# Patient Record
Sex: FEMALE | Race: WHITE | NOT HISPANIC OR LATINO | Employment: STUDENT | URBAN - METROPOLITAN AREA
[De-identification: names, ages, dates, MRNs, and addresses within clinical notes are randomized per-mention and may not be internally consistent; named-entity substitution may affect disease eponyms.]

---

## 2017-01-03 ENCOUNTER — OFFICE VISIT (OUTPATIENT)
Dept: DERMATOLOGY | Facility: CLINIC | Age: 22
End: 2017-01-03
Attending: DERMATOLOGY
Payer: COMMERCIAL

## 2017-01-03 ENCOUNTER — THERAPY VISIT (OUTPATIENT)
Dept: PHYSICAL THERAPY | Facility: CLINIC | Age: 22
End: 2017-01-03
Payer: COMMERCIAL

## 2017-01-03 DIAGNOSIS — M25.572 CHRONIC PAIN OF BOTH ANKLES: ICD-10-CM

## 2017-01-03 DIAGNOSIS — L70.0 ACNE VULGARIS: ICD-10-CM

## 2017-01-03 DIAGNOSIS — M25.562 ACUTE PAIN OF LEFT KNEE: Primary | ICD-10-CM

## 2017-01-03 DIAGNOSIS — G89.29 CHRONIC PAIN OF BOTH ANKLES: ICD-10-CM

## 2017-01-03 DIAGNOSIS — M25.571 CHRONIC PAIN OF BOTH ANKLES: ICD-10-CM

## 2017-01-03 DIAGNOSIS — L21.9 DERMATITIS, SEBORRHEIC: Primary | ICD-10-CM

## 2017-01-03 PROCEDURE — 99211 OFF/OP EST MAY X REQ PHY/QHP: CPT | Mod: ZF

## 2017-01-03 PROCEDURE — 97110 THERAPEUTIC EXERCISES: CPT | Mod: GP | Performed by: PHYSICAL THERAPIST

## 2017-01-03 RX ORDER — SULFACETAMIDE SODIUM, SULFUR 100; 50 MG/G; MG/G
LOTION TOPICAL
Qty: 60 G | Refills: 5 | Status: SHIPPED | OUTPATIENT
Start: 2017-01-03 | End: 2019-11-27

## 2017-01-03 ASSESSMENT — PAIN SCALES - GENERAL: PAINLEVEL: NO PAIN (0)

## 2017-01-03 NOTE — NURSING NOTE
Chief Complaint   Patient presents with     RECHECK     severe acne breakout     Yue Aparicio, CMA

## 2017-01-03 NOTE — Clinical Note
1/3/2017      RE: Farrah Tuckeral  [address]  [facility] [address]       Pediatric Dermatology Follow-up Visit    Referring Physician: Melissa Madden   CC:   Chief Complaint   Patient presents with     RECHECK     severe acne breakout      HPI:   This is a 20-year-old female who presents for evaluation a itchy, flaky rash on her bilateral eyebrows and scalp, as well as follow-up for acne. She was last seen in 7/2016 when she was continued on tretinoin 0.05% cream QHS for her acne as well as her OCP, Jaclyn. In terms of her acne, she states this is overall well controlled. She continues to take Jaclyn and uses tretinoin 0.05% cream at nighttime, mostly applied just to the forehead area. She occasionally will alternate with tretinoin 0.025% cream if she is getting too dry or irritated. She does get new inflammatory papules on the forehead, though is overall happy with her current treatment regimen. Otherwise, no other skin concerns. In terms of the rash on her eyebrows, she reports intermitted dry flaky skin with mild associated pruritus on her eyebrows and frontal scalp. This started about 1-month ago. She tried using an OTC anti-aging cream (brought in today to review ingredients --> mostly botanical products) that uses once daily. She states this has largely resolved the pruritus but she does notice occasional scale. She is otherwise well. No recent illnesses, fever, chills, night sweats. No other skin concerns.     Medications:   Current Outpatient Prescriptions   Medication Sig Dispense Refill     sulfacetamide sodium-sulfur 10-5 % LOTN Apply once daily to forehead. 60 g 5     albuterol (PROAIR HFA/PROVENTIL HFA/VENTOLIN HFA) 108 (90 BASE) MCG/ACT Inhaler Inhale 2 puffs into the lungs every 6 hours as needed for shortness of breath / dyspnea or wheezing 1 Inhaler 1     tretinoin (RETIN-A) 0.05 % cream Apply topically At Bedtime To back as directed 45 g 11     tretinoin (RETIN-A) 0.025 % cream Use every night  on face as directed 45 g 11     drospirenone-ethinyl estradiol (PRATIMA) 3-0.02 MG per tablet Take 1 tablet by mouth daily 30 tablet 11     Ibuprofen (ADVIL PO) Take 200 mg by mouth at onset of headache for moderate pain       EPINEPHrine (EPIPEN) 0.3 MG/0.3ML injection Inject 0.3 mLs (0.3 mg) into the muscle once as needed for anaphylaxis 2 each 1     Multiple Vitamins-Minerals (MULTIVITAMIN & MINERAL PO)         Allergies:   Allergies   Allergen Reactions     Amoxicillin Rash     Severe rash     Seafood Hives     Swollen Lips        ROS: a 10 point review of systems including constitutional, HEENT, CV, GI, musculoskeletal, Neurologic, Endocrine, Respiratory, Hematologic and Allergic/Immunologic was performed and was negative:   Physical examination: There were no vitals taken for this visit.   General: Well-developed, well-nourished in no apparent distress.  Eyelids and conjunctivae normal.    Resp: breathing comfortably in no distress  CV: well-perfused, no cyanosis  Abd: no distension  Ext: no deformity, clubbing or edema  Skin: A focuesed kin examination and palpation of skin and subcutaneous tissues of the scalp, eyebrows, face, chest, back was performed:  - Multiple scattered skin colored to pink papules on the bilateral forehead.   - Scattered erythema with non-adherence scale on bilateral eyebrows.   - Mild scale on the frontal scalp without evidence of significant scalp erythema or perifollicular accentuation.     In office labs or procedures performed today:   None     Assessment and Plan.     1.  Seborrheic dermatitis. Mild. Scalp and eyebrows. Appears mostly resolved on exam today. Recommend starting DHS Zinc shampoo. Proper application technique was reviewed with patient.  She can return as needed.     2.  Acne vulgaris. Forehead. Likely hormonal acne. We also questioned whether or not there was an additional component of pityrosporum folliculitis or demodex infestation or rosacea. She can continue prior  topical regiment including tretinoin 0.05% cream QHS and Jaclyn OCP. We recommended addition of sulfacetamide sodium-sulfur lotion in the morning. She had planned follow-up in 6 months and will return then.    - Continue Jaclyn (prescribed by PCP)  - not covered by insurance without a PA: sent Rx for metronidazole gel 0.75% to use once daily. If no improvement on this could submit for PA for sodium sulfacetamide.  - Continue tretinoin 0.05% cream QHS  - Follow-up in 6 months as previously scheduled.     Thank you for allowing us to participate in Farrah's care.    Andres Spencer MD   PGY-2 Dermatology Resident  Pager [phone]    I have personally examined this patient and agree with the resident's documentation and plan of care.  I have reviewed and amended the note above.  The documentation accurately reflects my clinical observations, diagnoses, treatment and follow-up plans.     Lorenza Rich MD  , Pediatric Dermatology

## 2017-01-03 NOTE — PROGRESS NOTES
Subjective:    HPI                    Objective:    System    Physical Exam    General     ROS    Assessment/Plan:      DISCHARGE REPORT    Progress reporting period is from 12/15/16 to 1/3/17.       SUBJECTIVE  Subjective changes noted by patient:  Has chose not to return to competitive tennis. Has no c/o pain B knees or achilles at this point.       Current pain level is 0/10  .     Previous pain level was  5/10  .   Changes in function:  Yes (See Goal flowsheet attached for changes in current functional level)  Adverse reaction to treatment or activity: None    OBJECTIVE  Changes noted in objective findings:  Yes, AROM WNL Ankles including full toe ext and 15 degrees DF  MMT's strong and pain free  Lateral tilt B patella  Weak Glut medius unable to control SL bridge > 5 reps B        ASSESSMENT/PLAN  Updated problem list and treatment plan: Diagnosis 1:  Achilles and knee pain  Decreased function - therapeutic activities and home program  STG/LTGs have been met or progress has been made towards goals:  Yes (See Goal flow sheet completed today.)  Assessment of Progress: The patient's condition has potential to improve.  Patient is meeting short term goals and is progressing towards long term goals.  Self Management Plans:  Patient is independent in a home treatment program.    Farrah continues to require the following intervention to meet STG and LTG's:  PT intervention is no longer required to meet STG/LTG.    Recommendations:  This patient is ready to be discharged from therapy and continue their home treatment program.    Please refer to the daily flowsheet for treatment today, total treatment time and time spent performing 1:1 timed codes.

## 2017-01-03 NOTE — PATIENT INSTRUCTIONS
[facility]- Pediatric Dermatology  Dr. Melissa Bell, Dr. Lorenza Rich, Dr. Roland Bennett, Dr. Bailey Baez, Dr. Kristopher Butterfield       Pediatric Appointment Scheduling and Call Center [phone]     Non Urgent -Triage Voicemail Line; [phone]- Kiki and Ania RN's. Messages are checked periodically throughout the day and are returned as soon as possible.      Clinic Fax number: [phone]    If you need a prescription refill, please contact your pharmacy. They will send us an electronic request. Refills are approved or denied by our Physicians during normal business hours, Monday through Fridays    Per office policy, refills will not be granted if you have not been seen within the past year (or sooner depending on your child's condition)    *Radiology Scheduling- [phone]  *Sedation Unit Scheduling- [phone]  *[place] Scheduling- General [phone]; Pediatric Dermatology [phone]  *Main  Services: [phone]   Macedonian: [phone]   Bolivian: [phone]   Hmong/Venezuelan/Carlos: [phone]    For urgent matters that cannot wait until the next business day, is over a holiday and/or a weekend please call [phone] and ask for the Dermatology Resident On-Call to be paged.      For seborrheic dermatitis (itching and flaking on forehead, and scalp)  1. Start using DHS zinc shampoo two to three time weekly in shower. Can apply to scalp, forehead, and face. Leave on for 3-5 minutes prior to rinsing.   2. Can use sulfur lotion once daily in the morning at least for the next week. Then, just as needed.   3. Follow-up in ~ 6 months as prior planned for annual acne appointment.

## 2017-01-03 NOTE — MR AVS SNAPSHOT
After Visit Summary   1/3/2017    Farrah Bowen    MRN: [id number]           Patient Information     Date Of Birth          1995        Visit Information        Provider Department      1/3/2017 1:30 PM Lorenza Rich MD Peds Dermatology        Today's Diagnoses     Dermatitis, seborrheic    -  1       Care Instructions    [facility]- Pediatric Dermatology  Dr. Melissa Bell, Dr. Lorenza Rich, Dr. Roland Bennett, Dr. Bailey Baez, Dr. Kristopher Butterfield       Pediatric Appointment Scheduling and Call Center [phone]     Non Urgent -Triage Voicemail Line; [phone]- Kiki and Ania RN's. Messages are checked periodically throughout the day and are returned as soon as possible.      Clinic Fax number: [phone]    If you need a prescription refill, please contact your pharmacy. They will send us an electronic request. Refills are approved or denied by our Physicians during normal business hours, Monday through Fridays    Per office policy, refills will not be granted if you have not been seen within the past year (or sooner depending on your child's condition)    *Radiology Scheduling- [phone]  *Sedation Unit Scheduling- [phone]  *[place] Scheduling- General [phone]; Pediatric Dermatology [phone]  *Main  Services: [phone]   Montenegrin: [phone]   Tristanian: [phone]   Hmong/Leonid/Carlos: [phone]    For urgent matters that cannot wait until the next business day, is over a holiday and/or a weekend please call [phone] and ask for the Dermatology Resident On-Call to be paged.      For seborrheic dermatitis (itching and flaking on forehead, and scalp)  1. Start using DHS zinc shampoo two to three time weekly in shower. Can apply to scalp, forehead, and face. Leave on for 3-5 minutes prior to rinsing.   2. Can use sulfur lotion once daily in the morning at least for the next week. Then, just  as needed.   3. Follow-up in ~ 6 months as prior planned for annual acne appointment.             Follow-ups after your visit        Who to contact     Please call your clinic at [phone] to:    Ask questions about your health    Make or cancel appointments    Discuss your medicines    Learn about your test results    Speak to your doctor   If you have compliments or concerns about an experience at your clinic, or if you wish to file a complaint, please contact [facility] Patient Relations at [phone] or email us at [email or web address]         Additional Information About Your Visit        [employer] Information     [employer] gives you secure access to your electronic health record. If you see a primary care provider, you can also send messages to your care team and make appointments. If you have questions, please call your primary care clinic.  If you do not have a primary care provider, please call [phone] and they will assist you.      [employer] is an electronic gateway that provides easy, online access to your medical records. With [employer], you can request a clinic appointment, read your test results, renew a prescription or communicate with your care team.     To access your existing account, please contact your [facility] Clinic or call [phone] for assistance.        Care EveryWhere ID     This is your Care EveryWhere ID. This could be used by other organizations to access your [place] medical records  [id number]         Blood Pressure from Last 3 Encounters:   12/19/16 116/62   07/19/16 116/64   06/17/16 105/69    Weight from Last 3 Encounters:   12/19/16 183 lb (83.008 kg)   11/25/16 185 lb 1.6 oz (83.961 kg)   07/19/16 183 lb 3.2 oz (83.1 kg) (95.11 %*)     * Growth percentiles are based on CDC 2-20 Years data.              Today, you had the following     No orders found for display         Today's Medication Changes           These changes are accurate as of: 1/3/17  1:49 PM.  If you have any questions, ask your nurse or doctor.               Start taking these medicines.        Dose/Directions    sulfacetamide sodium-sulfur 10-5 % Lotn   Used for:  Dermatitis, seborrheic   Started by:  Lorenza Rich MD        Apply once daily to forehead.   Quantity:  60 g   Refills:  5            Where to get your medicines      These medications were sent to [facility] - [facility] [address] AT [facility] [address], [facility] [address]     Phone:  [phone]    - sulfacetamide sodium-sulfur 10-5 % Lotn             Primary Care Provider Office Phone # Fax #    Cecelia Gonzalez [phone]       [facility] [address]  [place] [address]        Thank you!     [facility] you for [facility]  for your care. Our goal is always to provide you with excellent care. Hearing back from our patients is one way we can continue to improve our services. Please take a few minutes to complete the written survey that you may receive in the mail after your visit with us. Thank you!             Your Updated Medication List - Protect others around you: Learn how to safely use, store and throw away your medicines at www.disposemymeds.org.          This list is accurate as of: 1/3/17  1:49 PM.  Always use your most recent med list.                   Brand Name Dispense Instructions for use    ADVIL PO      Take 200 mg by mouth at onset of headache for moderate pain       albuterol 108 (90 BASE) MCG/ACT Inhaler    PROAIR HFA/PROVENTIL HFA/VENTOLIN HFA    1 Inhaler    Inhale 2 puffs into the lungs every 6 hours as needed for shortness of breath / dyspnea or wheezing       drospirenone-ethinyl estradiol 3-0.02 MG per tablet    PRATIMA    30 tablet    Take 1 tablet by mouth daily       EPINEPHrine 0.3 MG/0.3ML injection     2 each    Inject 0.3 mLs  (0.3 mg) into the muscle once as needed for anaphylaxis       MULTIVITAMIN & MINERAL PO          sulfacetamide sodium-sulfur 10-5 % Lotn     60 g    Apply once daily to forehead.       * tretinoin 0.05 % cream    RETIN-A    45 g    Apply topically At Bedtime To back as directed       * tretinoin 0.025 % cream    RETIN-A    45 g    Use every night on face as directed       * Notice:  This list has 2 medication(s) that are the same as other medications prescribed for you. Read the directions carefully, and ask your doctor or other care provider to review them with you.

## 2017-01-03 NOTE — PROGRESS NOTES
Pediatric Dermatology Follow-up Visit    Referring Physician: Melissa Madden   CC:   Chief Complaint   Patient presents with     RECHECK     severe acne breakout      HPI:   This is a 20-year-old female who presents for evaluation a itchy, flaky rash on her bilateral eyebrows and scalp, as well as follow-up for acne. She was last seen in 7/2016 when she was continued on tretinoin 0.05% cream QHS for her acne as well as her OCP, Pratima. In terms of her acne, she states this is overall well controlled. She continues to take Pratima and uses tretinoin 0.05% cream at nighttime, mostly applied just to the forehead area. She occasionally will alternate with tretinoin 0.025% cream if she is getting too dry or irritated. She does get new inflammatory papules on the forehead, though is overall happy with her current treatment regimen. Otherwise, no other skin concerns. In terms of the rash on her eyebrows, she reports intermitted dry flaky skin with mild associated pruritus on her eyebrows and frontal scalp. This started about 1-month ago. She tried using an OTC anti-aging cream (brought in today to review ingredients --> mostly botanical products) that uses once daily. She states this has largely resolved the pruritus but she does notice occasional scale. She is otherwise well. No recent illnesses, fever, chills, night sweats. No other skin concerns.     Medications:   Current Outpatient Prescriptions   Medication Sig Dispense Refill     sulfacetamide sodium-sulfur 10-5 % LOTN Apply once daily to forehead. 60 g 5     albuterol (PROAIR HFA/PROVENTIL HFA/VENTOLIN HFA) 108 (90 BASE) MCG/ACT Inhaler Inhale 2 puffs into the lungs every 6 hours as needed for shortness of breath / dyspnea or wheezing 1 Inhaler 1     tretinoin (RETIN-A) 0.05 % cream Apply topically At Bedtime To back as directed 45 g 11     tretinoin (RETIN-A) 0.025 % cream Use every night on face as directed 45 g 11     drospirenone-ethinyl estradiol (PRATIMA) 3-0.02 MG  per tablet Take 1 tablet by mouth daily 30 tablet 11     Ibuprofen (ADVIL PO) Take 200 mg by mouth at onset of headache for moderate pain       EPINEPHrine (EPIPEN) 0.3 MG/0.3ML injection Inject 0.3 mLs (0.3 mg) into the muscle once as needed for anaphylaxis 2 each 1     Multiple Vitamins-Minerals (MULTIVITAMIN & MINERAL PO)         Allergies:   Allergies   Allergen Reactions     Amoxicillin Rash     Severe rash     Seafood Hives     Swollen Lips        ROS: a 10 point review of systems including constitutional, HEENT, CV, GI, musculoskeletal, Neurologic, Endocrine, Respiratory, Hematologic and Allergic/Immunologic was performed and was negative:   Physical examination: There were no vitals taken for this visit.   General: Well-developed, well-nourished in no apparent distress.  Eyelids and conjunctivae normal.    Resp: breathing comfortably in no distress  CV: well-perfused, no cyanosis  Abd: no distension  Ext: no deformity, clubbing or edema  Skin: A focuesed kin examination and palpation of skin and subcutaneous tissues of the scalp, eyebrows, face, chest, back was performed:  - Multiple scattered skin colored to pink papules on the bilateral forehead.   - Scattered erythema with non-adherence scale on bilateral eyebrows.   - Mild scale on the frontal scalp without evidence of significant scalp erythema or perifollicular accentuation.     In office labs or procedures performed today:   None     Assessment and Plan.     1.  Seborrheic dermatitis. Mild. Scalp and eyebrows. Appears mostly resolved on exam today. Recommend starting DHS Zinc shampoo. Proper application technique was reviewed with patient.  She can return as needed.     2.  Acne vulgaris. Forehead. Likely hormonal acne. We also questioned whether or not there was an additional component of pityrosporum folliculitis or demodex infestation or rosacea. She can continue prior topical regiment including tretinoin 0.05% cream QHS and Jaclyn OCP. We recommended  addition of sulfacetamide sodium-sulfur lotion in the morning. She had planned follow-up in 6 months and will return then.    - Continue Jaclyn (prescribed by PCP)  - not covered by insurance without a PA: sent Rx for metronidazole gel 0.75% to use once daily. If no improvement on this could submit for PA for sodium sulfacetamide.  - Continue tretinoin 0.05% cream QHS  - Follow-up in 6 months as previously scheduled.     Thank you for allowing us to participate in Farrah's care.    Andres Spencer MD   PGY-2 Dermatology Resident  Pager [phone]    I have personally examined this patient and agree with the resident's documentation and plan of care.  I have reviewed and amended the note above.  The documentation accurately reflects my clinical observations, diagnoses, treatment and follow-up plans.     Lorenza Rich MD  , Pediatric Dermatology

## 2017-01-06 DIAGNOSIS — L70.0 ACNE VULGARIS: Primary | ICD-10-CM

## 2017-01-06 NOTE — TELEPHONE ENCOUNTER
Contacted pts mother at [phone], updated mom and new medication administration was explained. Mom was encouraged to call back if pt is reporting the medication is not helping after several weeks/months. Mom was agreeable and denied further questions or concerns.

## 2017-01-06 NOTE — TELEPHONE ENCOUNTER
Mom left message on triage line last evening explaining pts insurance will not cover the sulfacetamide sodium-sulfur 10-5 % lotion and pt will be leaving for out of town on Saturday. Mom requested a return phone call to her. Contacted pts pharmacy who explained the medication is not covered but is PA eligible. Contacted Dr. Rich, situation with pt leaving and medication not covered explained. TORB received from Dr. Rich for metronidazole 0.75% cream- Sig: Apply once daily to forehead every morning. 45 grams with 1 refill. Orders read back. And sent to pharmacy. Contacted pts pharmacy, spoke to Juan Ramon who explained the metronidazole cream was covered. Attempted to reach pts mother, no answer. Left generic message for mom to return phone call to clinic. Phone number provided.

## 2017-05-12 ENCOUNTER — OFFICE VISIT (OUTPATIENT)
Dept: FAMILY MEDICINE | Facility: CLINIC | Age: 22
End: 2017-05-12
Payer: COMMERCIAL

## 2017-05-12 VITALS
DIASTOLIC BLOOD PRESSURE: 64 MMHG | HEIGHT: 71 IN | WEIGHT: 190 LBS | OXYGEN SATURATION: 100 % | BODY MASS INDEX: 26.6 KG/M2 | RESPIRATION RATE: 16 BRPM | SYSTOLIC BLOOD PRESSURE: 118 MMHG | TEMPERATURE: 98.5 F | HEART RATE: 60 BPM

## 2017-05-12 DIAGNOSIS — L70.0 ACNE VULGARIS: ICD-10-CM

## 2017-05-12 DIAGNOSIS — Z11.3 SCREEN FOR STD (SEXUALLY TRANSMITTED DISEASE): ICD-10-CM

## 2017-05-12 DIAGNOSIS — Z00.00 ANNUAL VISIT FOR GENERAL ADULT MEDICAL EXAMINATION WITHOUT ABNORMAL FINDINGS: Primary | ICD-10-CM

## 2017-05-12 PROCEDURE — 99395 PREV VISIT EST AGE 18-39: CPT | Performed by: INTERNAL MEDICINE

## 2017-05-12 PROCEDURE — 87491 CHLMYD TRACH DNA AMP PROBE: CPT | Performed by: INTERNAL MEDICINE

## 2017-05-12 PROCEDURE — 87591 N.GONORRHOEAE DNA AMP PROB: CPT | Performed by: INTERNAL MEDICINE

## 2017-05-12 RX ORDER — DROSPIRENONE AND ETHINYL ESTRADIOL 0.02-3(28)
1 KIT ORAL DAILY
Qty: 84 TABLET | Refills: 3 | Status: SHIPPED | OUTPATIENT
Start: 2017-05-12 | End: 2018-05-15

## 2017-05-12 NOTE — MR AVS SNAPSHOT
After Visit Summary   5/12/2017    Farrah Bowen    MRN: [id number]           Patient Information     Date Of Birth          1995        Visit Information        Provider Department      5/12/2017 9:00 AM Cecelia Gonzalez MD [facility]        Today's Diagnoses     Annual visit for general adult medical examination without abnormal findings    -  1    Acne vulgaris        Screen for STD (sexually transmitted disease)          Care Instructions      Preventive Health Recommendations  Female Ages 18 to 25     Yearly exam:     See your health care provider every year in order to  o Review health changes.   o Discuss preventive care.    o Review your medicines if your doctor has prescribed any.      You should be tested each year for STDs (sexually transmitted diseases).       After age 20, talk to your provider about how often you should have cholesterol testing.      Starting at age 21, get a Pap test every three years. If you have an abnormal result, your doctor may have you test more often.      If you are at risk for diabetes, you should have a diabetes test (fasting glucose).     Shots:     Get a flu shot each year.     Get a tetanus shot every 10 years.     Consider getting the shot (vaccine) that prevents cervical cancer (Gardasil).    Nutrition:     Eat at least 5 servings of fruits and vegetables each day.    Eat whole-grain bread, whole-wheat pasta and brown rice instead of white grains and rice.    Talk to your provider about Calcium and Vitamin D.     Lifestyle    Exercise at least 150 minutes a week each week (30 minutes a day, 5 days a week). This will help you control your weight and prevent disease.    Limit alcohol to one drink per day.    No smoking.     Wear sunscreen to prevent skin cancer.    See your dentist every six months for an exam and cleaning.        Follow-ups after your visit        Who to contact     If you have questions or need follow up  "information about today's clinic visit or your schedule please contact [facility] [place] directly at [phone].  Normal or non-critical lab and imaging results will be communicated to you by MyChart, letter or phone within 4 business days after the clinic has received the results. If you do not hear from us within 7 days, please contact the clinic through [employer] or phone. If you have a critical or abnormal lab result, we will notify you by phone as soon as possible.  Submit refill requests through FeZo or call your pharmacy and they will forward the refill request to us. Please allow 3 business days for your refill to be completed.          Additional Information About Your Visit        [facility] Information     [employer] gives you secure access to your electronic health record. If you see a primary care provider, you can also send messages to your care team and make appointments. If you have questions, please call your primary care clinic.  If you do not have a primary care provider, please call [phone] and they will assist you.        Care EveryWhere ID     This is your Care EveryWhere ID. This could be used by other organizations to access your [facility] medical records  [id number]        Your Vitals Were     Pulse Temperature Respirations Height Last Period Pulse Oximetry    60 98.5  F (36.9  C) 16 5' 11\" (1.803 m) 04/28/2017 (Approximate) 100%    BMI (Body Mass Index)                   26.5 kg/m2            Blood Pressure from Last 3 Encounters:   05/12/17 118/64   12/19/16 116/62   07/19/16 116/64    Weight from Last 3 Encounters:   05/12/17 190 lb (86.2 kg)   12/19/16 183 lb (83 kg)   11/25/16 185 lb 1.6 oz (84 kg)              We Performed the Following     CHLAMYDIA TRACHOMATIS PCR     NEISSERIA GONORRHOEA PCR          Where to get your medicines      These medications were sent to [id number] - [facility] [address] AT [facility] [address] [place] [facility] [address]     Phone:  [phone]     drospirenone-ethinyl estradiol 3-0.02 MG per tablet          Primary Care Provider Office Phone # Fax #    Cecelia Gonzalez [phone]       [facility] [place] [address]  [place] [address]        Thank you!     Thank you for choosing [facility]  for your care. Our goal is always to provide you with excellent care. Hearing back from our patients is one way we can continue to improve our services. Please take a few minutes to complete the written survey that you may receive in the mail after your visit with us. Thank you!             Your Updated Medication List - Protect others around you: Learn how to safely use, store and throw away your medicines at www.disposemymeds.org.          This list is accurate as of: 5/12/17  9:31 AM.  Always use your most recent med list.                   Brand Name Dispense Instructions for use    albuterol 108 (90 BASE) MCG/ACT Inhaler    PROAIR HFA/PROVENTIL HFA/VENTOLIN HFA    1 Inhaler    Inhale 2 puffs into the lungs every 6 hours as needed for shortness of breath / dyspnea or wheezing       drospirenone-ethinyl estradiol 3-0.02 MG per tablet    PRATIMA    84 tablet    Take 1 tablet by mouth daily       EPINEPHrine 0.3 MG/0.3ML injection     2 each    Inject 0.3 mLs (0.3 mg) into the muscle once as needed for anaphylaxis       metroNIDAZOLE 0.75 % cream    METROCREAM    45 g    Apply once daily to forehead every morning       MULTIVITAMIN & MINERAL PO          sulfacetamide sodium-sulfur 10-5 % Lotn     60 g    Apply once daily to forehead.       * tretinoin 0.05 % cream    RETIN-A    45 g    Apply topically At Bedtime To back as directed       * tretinoin 0.025 % cream    RETIN-A    45 g    Use every night on face as directed       * Notice:  This list has 2 medication(s) that are the same as other medications prescribed for you. Read the directions  carefully, and ask your doctor or other care provider to review them with you.

## 2017-05-12 NOTE — PROGRESS NOTES
"Chief Complaint   Patient presents with     Physical       Initial /64  Pulse 60  Temp 98.5  F (36.9  C)  Resp 16  Ht 5' 11\" (1.803 m)  Wt 190 lb (86.2 kg)  LMP 04/28/2017 (Approximate)  SpO2 100%  BMI 26.5 kg/m2 Estimated body mass index is 26.5 kg/(m^2) as calculated from the following:    Height as of this encounter: 5' 11\" (1.803 m).    Weight as of this encounter: 190 lb (86.2 kg).  Medication Reconciliation: complete. RAMON Villeda LPN          Answers for HPI/ROS submitted by the patient on 5/11/2017   Annual Exam:  Getting at least 3 servings of Calcium per day:: Yes  Bi-annual eye exam:: Yes  Dental care twice a year:: Yes  Sleep apnea or symptoms of sleep apnea:: None  Frequency of exercise:: 2-3 days/week  Taking medications regularly:: Yes  Medication side effects:: Not applicable  Additional concerns today:: No  Q1: Little interest or pleasure in doing things: 0=Not at all  Q2: Feeling down, depressed or hopeless: 0=Not at all  PHQ-2 Score: 0  Duration of exercise:: 15-30 minutes     SUBJECTIVE:     CC: Farrah Bowen is an 21 year old woman who presents for preventive health visit.     Healthy Habits:    Do you get at least three servings of calcium containing foods daily (dairy, green leafy vegetables, etc.)? yes    Amount of exercise or daily activities, outside of work: 3-4 day(s) per week    Problems taking medications regularly No    Medication side effects: No    Have you had an eye exam in the past two years? yes    Do you see a dentist twice per year? yes    Do you have sleep apnea, excessive snoring or daytime drowsiness?no    Has been having a lot of canker sores lately and also some soreness in the left side of her throat. This started last week.       Today's PHQ-2 Score:   PHQ-2 ( 1999 Pfizer) 5/11/2017 12/19/2016   Q1: Little interest or pleasure in doing things - 0   Q2: Feeling down, depressed or hopeless - 0   PHQ-2 Score - 0   Little interest or pleasure in doing things Not " "at all -   Feeling down, depressed or hopeless Not at all -   PHQ-2 Score 0 -       Abuse: Current or Past(Physical, Sexual or Emotional)- No  Do you feel safe in your environment - Yes    Social History   Substance Use Topics     Smoking status: Never Smoker     Smokeless tobacco: Never Used     Alcohol use 0.0 oz/week     0 Standard drinks or equivalent per week      Comment: 3/ month     The patient does not drink >3 drinks per day nor >7 drinks per week.    No results for input(s): CHOL, HDL, LDL, TRIG, CHOLHDLRATIO, NHDL in the last 61972 hours.    Reviewed orders with patient.  Reviewed health maintenance and updated orders accordingly - Yes    RAMNO Villeda LPN        Mammo Decision Support:  Mammogram not appropriate for this patient based on age.    Pertinent mammograms are reviewed under the imaging tab.  History of abnormal Pap smear: NO - under age 21, PAP not appropriate for age    Reviewed and updated as needed this visit by clinical staff  Tobacco  Allergies  Meds  Soc Hx        Reviewed and updated as needed this visit by Provider            ROS:  C: NEGATIVE for fever, chills, change in weight  I: NEGATIVE for worrisome rashes, moles or lesions  E: NEGATIVE for vision changes or irritation  ENT: NEGATIVE for ear, mouth and throat problems  R: NEGATIVE for significant cough or SOB  B: NEGATIVE for masses, tenderness or discharge  CV: NEGATIVE for chest pain, palpitations or peripheral edema  GI: NEGATIVE for nausea, abdominal pain, heartburn, or change in bowel habits  : NEGATIVE for unusual urinary or vaginal symptoms. Periods are regular.  M: NEGATIVE for significant arthralgias or myalgia  N: NEGATIVE for weakness, dizziness or paresthesias  P: NEGATIVE for changes in mood or affect    Problem list, Medication list, Allergies, and Medical/Social/Surgical histories reviewed in [facility] and updated as appropriate.  OBJECTIVE:     /64  Pulse 60  Temp 98.5  F (36.9  C)  Resp 16  Ht 5' 11\" " "(1.803 m)  Wt 190 lb (86.2 kg)  LMP 04/28/2017 (Approximate)  SpO2 100%  BMI 26.5 kg/m2  EXAM:  GENERAL: healthy, alert and no distress  EYES: Eyes grossly normal to inspection, PERRL and conjunctivae and sclerae normal  HENT: ear canals and TM's normal, nose and mouth without ulcers or lesions  NECK: no adenopathy, no asymmetry, masses, or scars and thyroid normal to palpation  RESP: lungs clear to auscultation - no rales, rhonchi or wheezes  BREAST: normal without masses, tenderness or nipple discharge and no palpable axillary masses or adenopathy  CV: regular rate and rhythm, normal S1 S2, no S3 or S4, no murmur, click or rub, no peripheral edema and peripheral pulses strong  ABDOMEN: soft, nontender, no hepatosplenomegaly, no masses and bowel sounds normal   (female): normal female external genitalia, normal urethral meatus, vaginal mucosa pink, moist, well rugated  MS: no gross musculoskeletal defects noted, no edema  SKIN: no suspicious lesions or rashes  NEURO: Normal strength and tone, mentation intact and speech normal  PSYCH: mentation appears normal, affect normal/bright    ASSESSMENT/PLAN:     1. Annual visit for general adult medical examination without abnormal findings    2. Acne vulgaris  - drospirenone-ethinyl estradiol (PRATIMA) 3-0.02 MG per tablet; Take 1 tablet by mouth daily  Dispense: 84 tablet; Refill: 3    3. Screen for STD (sexually transmitted disease)  - NEISSERIA GONORRHOEA PCR  - CHLAMYDIA TRACHOMATIS PCR    COUNSELING:   Reviewed preventive health counseling, as reflected in patient instructions       Regular exercise       Healthy diet/nutrition       Vision screening       Hearing screening       Contraception       Safe sex practices/STD prevention         reports that she has never smoked. She has never used smokeless tobacco.    Estimated body mass index is 26.5 kg/(m^2) as calculated from the following:    Height as of this encounter: 5' 11\" (1.803 m).    Weight as of this " encounter: 190 lb (86.2 kg).       Counseling Resources:  ATP IV Guidelines  Pooled Cohorts Equation Calculator  Breast Cancer Risk Calculator  FRAX Risk Assessment  ICSI Preventive Guidelines  Dietary Guidelines for Americans, 2010  USDA's MyPlate  ASA Prophylaxis  Lung CA Screening    Cecelia Gonzalez MD  [facility]

## 2017-05-14 LAB
C TRACH DNA SPEC QL NAA+PROBE: NORMAL
N GONORRHOEA DNA SPEC QL NAA+PROBE: NORMAL
SPECIMEN SOURCE: NORMAL
SPECIMEN SOURCE: NORMAL

## 2017-08-04 ENCOUNTER — MYC MEDICAL ADVICE (OUTPATIENT)
Dept: DERMATOLOGY | Facility: CLINIC | Age: 22
End: 2017-08-04

## 2017-08-07 DIAGNOSIS — L70.0 ACNE VULGARIS: ICD-10-CM

## 2017-08-07 RX ORDER — TRETINOIN 0.25 MG/G
CREAM TOPICAL
Qty: 45 G | Refills: 11 | Status: SHIPPED | OUTPATIENT
Start: 2017-08-07 | End: 2019-11-27

## 2017-08-07 RX ORDER — TRETINOIN 0.5 MG/G
CREAM TOPICAL AT BEDTIME
Qty: 45 G | Refills: 11 | Status: SHIPPED | OUTPATIENT
Start: 2017-08-07 | End: 2019-11-27

## 2017-08-07 NOTE — TELEPHONE ENCOUNTER
See mychart request. Pt last seen by Dr Rich on 1-3-17 and currently does not have a follow up appt scheduled.

## 2017-08-09 ENCOUNTER — TELEPHONE (OUTPATIENT)
Dept: DERMATOLOGY | Facility: CLINIC | Age: 22
End: 2017-08-09

## 2017-08-09 NOTE — TELEPHONE ENCOUNTER
Initiated PA for Tretinoin 0.05% cream with Trini from [employer]. Provided all pertinent information and Trini was able to approve the tretinoin over the phone effective dates of 8-9-17 through 8-9-18. Trini provided reference number of [id number].    Trini did state that Tretinoin 0.025% cream had already been approved with effective dates of 8/7/17-8/7/18.    Spoke with [place] from [facility] pharmacy and updated them with PA approvals. Pharmacy to call patient once medication is ready for .

## 2017-10-06 ENCOUNTER — MYC MEDICAL ADVICE (OUTPATIENT)
Dept: FAMILY MEDICINE | Facility: CLINIC | Age: 22
End: 2017-10-06

## 2017-10-06 ENCOUNTER — OFFICE VISIT (OUTPATIENT)
Dept: FAMILY MEDICINE | Facility: CLINIC | Age: 22
End: 2017-10-06
Payer: COMMERCIAL

## 2017-10-06 VITALS
RESPIRATION RATE: 14 BRPM | TEMPERATURE: 97.2 F | HEIGHT: 71 IN | HEART RATE: 82 BPM | OXYGEN SATURATION: 100 % | DIASTOLIC BLOOD PRESSURE: 67 MMHG | BODY MASS INDEX: 26.1 KG/M2 | SYSTOLIC BLOOD PRESSURE: 118 MMHG | WEIGHT: 186.4 LBS

## 2017-10-06 DIAGNOSIS — Z23 NEED FOR PROPHYLACTIC VACCINATION AND INOCULATION AGAINST INFLUENZA: ICD-10-CM

## 2017-10-06 DIAGNOSIS — F52.9 SEXUAL DYSFUNCTION IN FEMALES: ICD-10-CM

## 2017-10-06 DIAGNOSIS — Z12.4 SCREENING FOR CERVICAL CANCER: ICD-10-CM

## 2017-10-06 DIAGNOSIS — F41.1 GAD (GENERALIZED ANXIETY DISORDER): Primary | ICD-10-CM

## 2017-10-06 PROCEDURE — 90686 IIV4 VACC NO PRSV 0.5 ML IM: CPT | Performed by: INTERNAL MEDICINE

## 2017-10-06 PROCEDURE — 99214 OFFICE O/P EST MOD 30 MIN: CPT | Mod: 25 | Performed by: INTERNAL MEDICINE

## 2017-10-06 PROCEDURE — 90471 IMMUNIZATION ADMIN: CPT | Performed by: INTERNAL MEDICINE

## 2017-10-06 RX ORDER — HYDROXYZINE HYDROCHLORIDE 25 MG/1
25-50 TABLET, FILM COATED ORAL EVERY 6 HOURS PRN
Qty: 60 TABLET | Refills: 1 | Status: SHIPPED | OUTPATIENT
Start: 2017-10-06 | End: 2017-11-27

## 2017-10-06 RX ORDER — PROPRANOLOL HYDROCHLORIDE 10 MG/1
10 TABLET ORAL 3 TIMES DAILY PRN
Qty: 90 TABLET | Refills: 3 | Status: SHIPPED | OUTPATIENT
Start: 2017-10-06 | End: 2018-05-15

## 2017-10-06 ASSESSMENT — ANXIETY QUESTIONNAIRES
7. FEELING AFRAID AS IF SOMETHING AWFUL MIGHT HAPPEN: NEARLY EVERY DAY
5. BEING SO RESTLESS THAT IT IS HARD TO SIT STILL: NEARLY EVERY DAY
1. FEELING NERVOUS, ANXIOUS, OR ON EDGE: NEARLY EVERY DAY
2. NOT BEING ABLE TO STOP OR CONTROL WORRYING: NEARLY EVERY DAY
3. WORRYING TOO MUCH ABOUT DIFFERENT THINGS: NEARLY EVERY DAY
6. BECOMING EASILY ANNOYED OR IRRITABLE: NEARLY EVERY DAY
GAD7 TOTAL SCORE: 21
IF YOU CHECKED OFF ANY PROBLEMS ON THIS QUESTIONNAIRE, HOW DIFFICULT HAVE THESE PROBLEMS MADE IT FOR YOU TO DO YOUR WORK, TAKE CARE OF THINGS AT HOME, OR GET ALONG WITH OTHER PEOPLE: VERY DIFFICULT

## 2017-10-06 ASSESSMENT — PATIENT HEALTH QUESTIONNAIRE - PHQ9
5. POOR APPETITE OR OVEREATING: NEARLY EVERY DAY
SUM OF ALL RESPONSES TO PHQ QUESTIONS 1-9: 19

## 2017-10-06 NOTE — PATIENT INSTRUCTIONS
Hydoxyzine (or Atarax) is also used as needed for anxiety and can help to calm someone down. This can make you drowsy so best used before sleep.

## 2017-10-06 NOTE — MR AVS SNAPSHOT
After Visit Summary   10/6/2017    Farrah Bowen    MRN: [id number]           Patient Information     Date Of Birth          1995        Visit Information        Provider Department      10/6/2017 9:40 AM Cecelia Gonzalez MD [facility] [place]        Today's Diagnoses     TJ (generalized anxiety disorder)    -  1    Sexual dysfunction in females        Need for prophylactic vaccination and inoculation against influenza        Screening for cervical cancer          Care Instructions    Hydoxyzine (or Atarax) is also used as needed for anxiety and can help to calm someone down. This can make you drowsy so best used before sleep.              Follow-ups after your visit        Additional Services     OB/GYN REFERRAL       Your provider has referred you to:  [facility] [place] [phone]   [email or web address]    Dr. Nette Kyle    Please be aware that coverage of these services is subject to the terms and limitations of your health insurance plan.  Call member services at your health plan with any benefit or coverage questions.      Please bring the following with you to your appointment:    (1) Any X-Rays, CTs or MRIs which have been performed.  Contact the facility where they were done to arrange for  prior to your scheduled appointment.   (2) List of current medications   (3) This referral request   (4) Any documents/labs given to you for this referral                  Who to contact     If you have questions or need follow up information about today's clinic visit or your schedule please contact [facility] [place] directly at [phone].  Normal or non-critical lab and imaging results will be communicated to you by MyChart, letter or phone within 4 business days after the clinic has received the results. If you do not hear from us within 7 days, please contact the clinic through MyChart or phone. If you have a critical or abnormal lab result,  "we will notify you by phone as soon as possible.  Submit refill requests through [employer] or call your pharmacy and they will forward the refill request to us. Please allow 3 business days for your refill to be completed.          Additional Information About Your Visit        [facility] Information     [employer] gives you secure access to your electronic health record. If you see a primary care provider, you can also send messages to your care team and make appointments. If you have questions, please call your primary care clinic.  If you do not have a primary care provider, please call [phone] and they will assist you.        Care EveryWhere ID     This is your Care EveryWhere ID. This could be used by other organizations to access your [facility] medical records  [id number]        Your Vitals Were     Pulse Temperature Respirations Height Last Period Pulse Oximetry    82 97.2  F (36.2  C) (Tympanic) 14 5' 11\" (1.803 m) 09/20/2017 (Approximate) 100%    BMI (Body Mass Index)                   26 kg/m2            Blood Pressure from Last 3 Encounters:   10/06/17 118/67   05/12/17 118/64   12/19/16 116/62    Weight from Last 3 Encounters:   10/06/17 186 lb 6.4 oz (84.6 kg)   05/12/17 190 lb (86.2 kg)   12/19/16 183 lb (83 kg)              We Performed the Following     OB/GYN REFERRAL          Today's Medication Changes          These changes are accurate as of: 10/6/17 10:11 AM.  If you have any questions, ask your nurse or doctor.               Start taking these medicines.        Dose/Directions    propranolol 10 MG tablet   Commonly known as:  INDERAL   Used for:  Need for prophylactic vaccination and inoculation against influenza   Started by:  Cecelia Gonzalez MD        Dose:  10 mg   Take 1 tablet (10 mg) by mouth 3 times daily as needed   Quantity:  90 tablet   Refills:  3            Where to get your medicines      These medications were sent to [facility] [address] [place], [place] - [address] AT [facility] [address]  [address], [facility] [address]     Phone:  [phone]     propranolol 10 MG tablet                Primary Care Provider Office Phone # Fax #    Cecelia Gonzalez [phone]       [address]  [place] [address]        Equal Access to Services     REGGIE SAWYER AH: Hadii aad ku hadasho Soomaali, waaxda luqadaha, qaybta kaalmada adeegyada, waxay idiin hayaan adeeg kharash la'aan ah. So [facility] [phone].    ATENCIÓN: Si ashkan esptheodora, tiene a magaña disposición servicios gratuitos de asistencia lingüística. [facility] [phone].    We comply with applicable federal civil rights laws and [place] laws. We do not discriminate on the basis of race, color, national origin, age, disability, sex, sexual orientation, or gender identity.            Thank you!     [facility]  [facility] your [facility]. Our goal is always to provide you with excellent care. Hearing back from our patients is one way we can continue to improve our services. Please take a few minutes to complete the written survey that you may receive in the mail after your visit with us. Thank you!             Your Updated Medication List - Protect others around you: Learn how to safely use, store and throw away your medicines at www.disposemymeds.org.          This list is accurate as of: 10/6/17 10:11 AM.  Always use your most recent med list.                   Brand Name Dispense Instructions for use Diagnosis    albuterol 108 (90 BASE) MCG/ACT Inhaler    PROAIR HFA/PROVENTIL HFA/VENTOLIN HFA    1 Inhaler    Inhale 2 puffs into the lungs every 6 hours as needed for shortness of breath / dyspnea or wheezing    Acute bronchospasm       drospirenone-ethinyl estradiol 3-0.02 MG per tablet    PRATIMA    84 tablet    Take 1 tablet by mouth daily    Acne vulgaris       EPINEPHrine 0.3 MG/0.3ML injection 2-pack    EPIPEN/ADRENACLICK/or ANY BX GENERIC EQUIV    2 each     Inject 0.3 mLs (0.3 mg) into the muscle once as needed for anaphylaxis    Dermatitis due to food taken internally       metroNIDAZOLE 0.75 % cream    METROCREAM    45 g    Apply once daily to forehead every morning    Acne vulgaris       MULTIVITAMIN & MINERAL PO           propranolol 10 MG tablet    INDERAL    90 tablet    Take 1 tablet (10 mg) by mouth 3 times daily as needed    Need for prophylactic vaccination and inoculation against influenza       sulfacetamide sodium-sulfur 10-5 % Lotn     60 g    Apply once daily to forehead.    Dermatitis, seborrheic       * tretinoin 0.05 % cream    RETIN-A    45 g    Apply topically At Bedtime To back as directed    Acne vulgaris       * tretinoin 0.025 % cream    RETIN-A    45 g    Use every night on face as directed    Acne vulgaris       * Notice:  This list has 2 medication(s) that are the same as other medications prescribed for you. Read the directions carefully, and ask your doctor or other care provider to review them with you.

## 2017-10-06 NOTE — TELEPHONE ENCOUNTER
Please see My Chart message below and advise as appropriate.  Elizabeth Mckinney RN - Triage  [facility]

## 2017-10-06 NOTE — NURSING NOTE
"Chief Complaint   Patient presents with     Medication Request     Propranolol     Flu Shot       Initial /67 (BP Location: Left arm, Patient Position: Chair, Cuff Size: Adult Regular)  Pulse 82  Temp 97.2  F (36.2  C) (Tympanic)  Resp 14  Ht 5' 11\" (1.803 m)  Wt 186 lb 6.4 oz (84.6 kg)  LMP 09/20/2017 (Approximate)  SpO2 100%  BMI 26 kg/m2 Estimated body mass index is 26 kg/(m^2) as calculated from the following:    Height as of this encounter: 5' 11\" (1.803 m).    Weight as of this encounter: 186 lb 6.4 oz (84.6 kg).  Medication Reconciliation: complete    Kanwal Rose MA  "

## 2017-10-06 NOTE — PROGRESS NOTES
Injectable Influenza Immunization Documentation    1.  Is the person to be vaccinated sick today?   No    2. Does the person to be vaccinated have an allergy to a component   of the vaccine?   No    3. Has the person to be vaccinated ever had a serious reaction   to influenza vaccine in the past?   No    4. Has the person to be vaccinated ever had Guillain-Barré syndrome?   No    Form completed by Kanwal Rose MA

## 2017-10-06 NOTE — PROGRESS NOTES
"  SUBJECTIVE:   Farrah Bowen is a 22 year old female who presents to clinic today for the following health issues:      Medication Request     Description:   Pt would like to start propranolol for anxiety    Intensity: moderate    Progression of Symptoms:  worsening    Accompanying Signs & Symptoms:  Racing heart, racing thoughts, not sleeping    Previous history of similar problem:   Yes, high school    Precipitating factors:   Worsened by: Stress     Alleviating factors:  Improved by: ?    Therapies Tried and outcome: None    Farrah tells me that she has struggled with anxiety since she was in high school. Her mother is very anti-medication and so tells me that she was never given the opportunity to be on anything. She tells me that she has really bad test anxiety and two nights ago she had a panic attack in the library.     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 She has mid-terms coming up and is also working to find an internship. She is not sleeping well.     Farrah also is concerned about her sexual activity. She has been sexually active for over 2 years but has not yet been able to orgasm. She reports that even with manipulation by her partner or by herself she has not been able to orgasm. Getting regular menstrual periods. No history of sexual trauma. She is getting very discouraged by this and feels embarrassed by it. She hasn't told any of her friends because she feels that they wouldn't understand. She thinks it is making her feel \"less than\" worthy.    Problem list and histories reviewed & adjusted, as indicated.  Additional history: as documented    Patient Active Problem List   Diagnosis     Acne vulgaris     Achilles tendonitis     Acne     Hearing loss in right ear     No past surgical history on file.    Social History   Substance Use Topics     Smoking status: Never Smoker     Smokeless tobacco: Never Used     Alcohol use 0.0 oz/week     0 Standard drinks or equivalent per week      Comment: 3/ month     Family " History   Problem Relation Age of Onset     Unknown/Adopted Father      Obesity Mother      Eye Disorder Paternal Grandmother      glaucoma     Breast Cancer Paternal Grandmother      Hypertension Paternal Grandmother      Glaucoma Paternal Grandmother      Anxiety Disorder Paternal Grandmother      CANCER Paternal Grandmother      Other Cancer Paternal Grandmother      Alzheimer Disease Other      Multiple Family Members     Hypertension Paternal Grandfather      CEREBROVASCULAR DISEASE Paternal Grandfather      Cardiac Sudden Death Paternal Grandfather      DIABETES No family hx of      Thyroid Disease No family hx of      Macular Degeneration No family hx of          Current Outpatient Prescriptions   Medication Sig Dispense Refill     tretinoin (RETIN-A) 0.05 % cream Apply topically At Bedtime To back as directed 45 g 11     tretinoin (RETIN-A) 0.025 % cream Use every night on face as directed 45 g 11     drospirenone-ethinyl estradiol (PRATIMA) 3-0.02 MG per tablet Take 1 tablet by mouth daily 84 tablet 3     metroNIDAZOLE (METROCREAM) 0.75 % cream Apply once daily to forehead every morning 45 g 1     sulfacetamide sodium-sulfur 10-5 % LOTN Apply once daily to forehead. 60 g 5     albuterol (PROAIR HFA/PROVENTIL HFA/VENTOLIN HFA) 108 (90 BASE) MCG/ACT Inhaler Inhale 2 puffs into the lungs every 6 hours as needed for shortness of breath / dyspnea or wheezing 1 Inhaler 1     EPINEPHrine (EPIPEN) 0.3 MG/0.3ML injection Inject 0.3 mLs (0.3 mg) into the muscle once as needed for anaphylaxis 2 each 1     Multiple Vitamins-Minerals (MULTIVITAMIN & MINERAL PO)            Reviewed and updated as needed this visit by clinical staff     Reviewed and updated as needed this visit by Provider         ROS:  Constitutional, HEENT, cardiovascular, pulmonary, gi and gu systems are negative, except as otherwise noted.      OBJECTIVE:   /67 (BP Location: Left arm, Patient Position: Chair, Cuff Size: Adult Regular)  Pulse 82   "Temp 97.2  F (36.2  C) (Tympanic)  Resp 14  Ht 5' 11\" (1.803 m)  Wt 186 lb 6.4 oz (84.6 kg)  LMP 09/20/2017 (Approximate)  SpO2 100%  BMI 26 kg/m2  Body mass index is 26 kg/(m^2).  GENERAL: healthy, alert and no distress  NECK: no adenopathy, no asymmetry, masses, or scars and thyroid normal to palpation  RESP: lungs clear to auscultation - no rales, rhonchi or wheezes  CV: regular rate and rhythm, normal S1 S2, no S3 or S4, no murmur, click or rub, no peripheral edema and peripheral pulses strong  PSYCH: mentation appears normal, affect normal/bright    Diagnostic Test Results:  none     ASSESSMENT/PLAN:     1. TJ (generalized anxiety disorder)  Farrah scored very high on her TJ-7 today. I think she would benefit greatly from an SSRI, but I also understand her reservations. I talked to her about the wide range of medication options. We will start with Propranolol today and use PRN. I also offered Atarax to take at night time before bed. She will schedule a follow up with me during Jeff break so we can discuss her progress and make changes/add medications as needed.    2. Sexual dysfunction in females  Discussed that a lot of sexual problems in females are mental - anxiety, depression, stress. Farrah certainly struggles with anxiety so this could be playing a big role in her inability to orgasm. Discussed that hormone influences (oxytocin) are also involved. Cognitive behavior therapy may be helpful, and referral to endocrinology may also be useful. Farrah is due for a pap and so I am referring her to gynecology both for pap and to discuss these symptoms. I would like her to see Dr. Kyle at [facility]. I am also going to look into additional referrals for her for either CBT or endo. I will get back to her with this information.     3. Need for prophylactic vaccination and inoculation against influenza      4. Screening for cervical cancer  - OB/GYN REFERRAL    Follow up in 3 month(s) or sooner if " needed      Cecelia Gonzalez MD  [facility] [place]

## 2017-10-07 ASSESSMENT — ANXIETY QUESTIONNAIRES: GAD7 TOTAL SCORE: 21

## 2017-10-10 ENCOUNTER — MYC MEDICAL ADVICE (OUTPATIENT)
Dept: FAMILY MEDICINE | Facility: CLINIC | Age: 22
End: 2017-10-10

## 2017-11-24 ENCOUNTER — OFFICE VISIT (OUTPATIENT)
Dept: URGENT CARE | Facility: URGENT CARE | Age: 22
End: 2017-11-24
Payer: COMMERCIAL

## 2017-11-24 ENCOUNTER — RADIANT APPOINTMENT (OUTPATIENT)
Dept: GENERAL RADIOLOGY | Facility: CLINIC | Age: 22
End: 2017-11-24
Attending: NURSE PRACTITIONER
Payer: COMMERCIAL

## 2017-11-24 VITALS
TEMPERATURE: 101.3 F | DIASTOLIC BLOOD PRESSURE: 83 MMHG | HEART RATE: 132 BPM | SYSTOLIC BLOOD PRESSURE: 122 MMHG | OXYGEN SATURATION: 97 %

## 2017-11-24 DIAGNOSIS — R09.89 CHEST CONGESTION: ICD-10-CM

## 2017-11-24 DIAGNOSIS — J18.9 PNEUMONIA OF RIGHT UPPER LOBE DUE TO INFECTIOUS ORGANISM: Primary | ICD-10-CM

## 2017-11-24 DIAGNOSIS — J11.1 INFLUENZA WITH RESPIRATORY MANIFESTATION OTHER THAN PNEUMONIA: ICD-10-CM

## 2017-11-24 LAB
BASOPHILS # BLD AUTO: 0 10E9/L (ref 0–0.2)
BASOPHILS NFR BLD AUTO: 0.1 %
DEPRECATED S PYO AG THROAT QL EIA: NORMAL
DIFFERENTIAL METHOD BLD: NORMAL
EOSINOPHIL # BLD AUTO: 0 10E9/L (ref 0–0.7)
EOSINOPHIL NFR BLD AUTO: 0.4 %
ERYTHROCYTE [DISTWIDTH] IN BLOOD BY AUTOMATED COUNT: 12.2 % (ref 10–15)
FLUAV+FLUBV AG SPEC QL: NEGATIVE
FLUAV+FLUBV AG SPEC QL: NEGATIVE
HCT VFR BLD AUTO: 40.1 % (ref 35–47)
HETEROPH AB SER QL: NEGATIVE
HGB BLD-MCNC: 13.5 G/DL (ref 11.7–15.7)
LYMPHOCYTES # BLD AUTO: 1.2 10E9/L (ref 0.8–5.3)
LYMPHOCYTES NFR BLD AUTO: 16.5 %
MCH RBC QN AUTO: 29.5 PG (ref 26.5–33)
MCHC RBC AUTO-ENTMCNC: 33.7 G/DL (ref 31.5–36.5)
MCV RBC AUTO: 88 FL (ref 78–100)
MONOCYTES # BLD AUTO: 0.8 10E9/L (ref 0–1.3)
MONOCYTES NFR BLD AUTO: 11.1 %
NEUTROPHILS # BLD AUTO: 5.4 10E9/L (ref 1.6–8.3)
NEUTROPHILS NFR BLD AUTO: 71.9 %
PLATELET # BLD AUTO: 223 10E9/L (ref 150–450)
RBC # BLD AUTO: 4.58 10E12/L (ref 3.8–5.2)
SPECIMEN SOURCE: NORMAL
SPECIMEN SOURCE: NORMAL
WBC # BLD AUTO: 7.5 10E9/L (ref 4–11)

## 2017-11-24 PROCEDURE — 87804 INFLUENZA ASSAY W/OPTIC: CPT | Performed by: NURSE PRACTITIONER

## 2017-11-24 PROCEDURE — 87880 STREP A ASSAY W/OPTIC: CPT | Performed by: NURSE PRACTITIONER

## 2017-11-24 PROCEDURE — 99214 OFFICE O/P EST MOD 30 MIN: CPT | Performed by: NURSE PRACTITIONER

## 2017-11-24 PROCEDURE — 36415 COLL VENOUS BLD VENIPUNCTURE: CPT | Performed by: NURSE PRACTITIONER

## 2017-11-24 PROCEDURE — 71020 XR CHEST 2 VW: CPT

## 2017-11-24 PROCEDURE — 85025 COMPLETE CBC W/AUTO DIFF WBC: CPT | Performed by: NURSE PRACTITIONER

## 2017-11-24 PROCEDURE — 86308 HETEROPHILE ANTIBODY SCREEN: CPT | Performed by: NURSE PRACTITIONER

## 2017-11-24 PROCEDURE — 87081 CULTURE SCREEN ONLY: CPT | Performed by: NURSE PRACTITIONER

## 2017-11-24 RX ORDER — CODEINE PHOSPHATE AND GUAIFENESIN 10; 100 MG/5ML; MG/5ML
2 SOLUTION ORAL EVERY 4 HOURS PRN
Qty: 240 ML | Refills: 0 | Status: SHIPPED | OUTPATIENT
Start: 2017-11-24 | End: 2017-12-15

## 2017-11-24 RX ORDER — DOXYCYCLINE 100 MG/1
100 CAPSULE ORAL 2 TIMES DAILY
Qty: 20 CAPSULE | Refills: 0 | Status: SHIPPED | OUTPATIENT
Start: 2017-11-24 | End: 2017-11-27

## 2017-11-24 RX ORDER — ALBUTEROL SULFATE 90 UG/1
2 AEROSOL, METERED RESPIRATORY (INHALATION) EVERY 6 HOURS PRN
Qty: 1 INHALER | Refills: 0 | Status: SHIPPED | OUTPATIENT
Start: 2017-11-24 | End: 2017-11-27

## 2017-11-24 ASSESSMENT — ENCOUNTER SYMPTOMS
WHEEZING: 0
SPUTUM PRODUCTION: 0
SHORTNESS OF BREATH: 0
EYES NEGATIVE: 1
COUGH: 1
MYALGIAS: 1
HEMOPTYSIS: 0
GASTROINTESTINAL NEGATIVE: 1
CHILLS: 1
FEVER: 1
CARDIOVASCULAR NEGATIVE: 1
NEUROLOGICAL NEGATIVE: 1

## 2017-11-24 NOTE — MR AVS SNAPSHOT
After Visit Summary   11/24/2017    Farrah Bowen    MRN: [id number]           Patient Information     Date Of Birth          1995        Visit Information        Provider Department      11/24/2017 8:20 PM Sharon Pickering APRN [facility]        Today's Diagnoses     Pneumonia of right upper lobe due to infectious organism (H)    -  1    Influenza with respiratory manifestation other than pneumonia        Chest congestion          Care Instructions      Pneumonia (Adult)  Pneumonia is an infection deep within the lungs. It is in the small air sacs (alveoli). Pneumonia may be caused by a virus or bacteria. Pneumonia caused by bacteria is usually treated with an antibiotic. Severe cases may need to be treated in the hospital. Milder cases can be treated at home. Symptoms usually start to get better during the first 2 days of treatment.    Home care  Follow these guidelines when caring for yourself at home:    Rest at home for the first 2 to 3 days, or until you feel stronger. Don t let yourself get overly tired when you go back to your activities.    Stay away from cigarette smoke - yours or other people s.    You may use acetaminophen or ibuprofen to control fever or pain, unless another medicine was prescribed. If you have chronic liver or kidney disease, talk with your healthcare provider before using these medicines. Also talk with your provider if you ve had a stomach ulcer or gastrointestinal bleeding. Don t give aspirin to anyone younger than 18 years of age who is ill with a fever. It may cause severe liver damage.    Your appetite may be poor, so a light diet is fine.    Drink 6 to 8 glasses of fluids every day to make sure you are getting enough fluids. Beverages can include water, sport drinks, sodas without caffeine, juices, tea, or soup. Fluids will help loosen secretions in the lung. This will make it easier for you to cough up the phlegm (sputum). If  you also have heart or kidney disease, check with your healthcare provider before you drink extra fluids.    Take antibiotic medicine prescribed until it is all gone, even if you are feeling better after a few days.  Follow-up care  Follow up with your healthcare provider in the next 2 to 3 days, or as advised. This is to be sure the medicine is helping you get better.  If you are 65 or older, you should get a pneumococcal vaccine and a yearly flu (influenza) shot. You should also get these vaccines if you have chronic lung disease like asthma, emphysema, or COPD. Recently, a second type of pneumonia vaccine has become available for everyone over 65 years old. This is in addition to the previous vaccine. Ask your provider about this.  When to seek medical advice  Call your healthcare provider right away if any of these occur:    You don t get better within the first 48 hours of treatment    Shortness of breath gets worse    Rapid breathing (more than 25 breaths per minute)    Coughing up blood    Chest pain gets worse with breathing    Fever of 100.4 F (38 C) or higher that doesn t get better with fever medicine    Weakness, dizziness, or fainting that gets worse    Thirst or dry mouth that gets worse    Sinus pain, headache, or a stiff neck    Chest pain not caused by coughing  Date Last Reviewed: 1/1/2017 2000-2017 [employer]. [address] [place] [address], [place] [address]. All rights reserved. This information is not intended as a substitute for professional medical care. Always follow your healthcare professional's instructions.                Follow-ups after your visit        Follow-up notes from your care team     Return in about 3 days (around 11/27/2017) for Recheck with primary care provider.      Who to contact     If you have questions or need follow up information about today's clinic visit or your schedule please contact [facility] directly at [phone].  Normal  or non-critical lab and imaging results will be communicated to you by [employer], letter or phone within 4 business days after the clinic has received the results. If you do not hear from us within 7 days, please contact the clinic through [employer] or phone. If you have a critical or abnormal lab result, we will notify you by phone as soon as possible.  Submit refill requests through [employer] or call your pharmacy and they will forward the refill request to us. Please allow 3 business days for your refill to be completed.          Additional Information About Your Visit        [employer] Information     [employer] gives you secure access to your electronic health record. If you see a primary care provider, you can also send messages to your care team and make appointments. If you have questions, please call your primary care clinic.  If you do not have a primary care provider, please call [phone] and they will assist you.        Care EveryWhere ID     This is your Care EveryWhere ID. This could be used by other organizations to access your [facility] medical records  [id number]        Your Vitals Were     Pulse Temperature Pulse Oximetry Breastfeeding?          132 101.3  F (38.5  C) (Oral) 97% No         Blood Pressure from Last 3 Encounters:   11/24/17 122/83   10/06/17 118/67   05/12/17 118/64    Weight from Last 3 Encounters:   10/06/17 186 lb 6.4 oz (84.6 kg)   05/12/17 190 lb (86.2 kg)   12/19/16 183 lb (83 kg)              We Performed the Following     Beta strep group A culture     CBC with platelets differential     Influenza A/B antigen     Mononucleosis screen     Strep, Rapid Screen     XR Chest 2 Views          Today's Medication Changes          These changes are accurate as of: 11/24/17  9:36 PM.  If you have any questions, ask your nurse or doctor.               Start taking these medicines.        Dose/Directions    doxycycline 100 MG capsule   Commonly known as:  VIBRAMYCIN   Used for:  Pneumonia of  right upper lobe due to infectious organism (H)   Started by:  Sharon Pickering APRN CNP        Dose:  100 mg   Take 1 capsule (100 mg) by mouth 2 times daily   Quantity:  20 capsule   Refills:  0       guaiFENesin-codeine 100-10 MG/5ML Soln solution   Commonly known as:  ROBITUSSIN AC   Used for:  Pneumonia of right upper lobe due to infectious organism (H)   Started by:  Sharon Pickering APRN CNP        Dose:  2 tsp.   Take 10 mLs by mouth every 4 hours as needed for cough   Quantity:  240 mL   Refills:  0         These medicines have changed or have updated prescriptions.        Dose/Directions    * albuterol 108 (90 BASE) MCG/ACT Inhaler   Commonly known as:  PROAIR HFA/PROVENTIL HFA/VENTOLIN HFA   This may have changed:  Another medication with the same name was added. Make sure you understand how and when to take each.   Used for:  Acute bronchospasm   Changed by:  Cecelia Falk PA-C        Dose:  2 puff   Inhale 2 puffs into the lungs every 6 hours as needed for shortness of breath / dyspnea or wheezing   Quantity:  1 Inhaler   Refills:  1       * albuterol 108 (90 BASE) MCG/ACT Inhaler   Commonly known as:  PROAIR HFA/PROVENTIL HFA/VENTOLIN HFA   This may have changed:  You were already taking a medication with the same name, and this prescription was added. Make sure you understand how and when to take each.   Used for:  Pneumonia of right upper lobe due to infectious organism (H)   Changed by:  Sharon Pickering APRN CNP        Dose:  2 puff   Inhale 2 puffs into the lungs every 6 hours as needed for shortness of breath / dyspnea or wheezing   Quantity:  1 Inhaler   Refills:  0       * Notice:  This list has 2 medication(s) that are the same as other medications prescribed for you. Read the directions carefully, and ask your doctor or other care provider to review them with you.         Where to get your medicines      These medications were sent to [facility]  [id number] - [place], [place] - [address] AT [address] [place] [address] & [facility]  [address] [place] [address] [place] [address]    Hours:  24-hours Phone:  [phone]     albuterol 108 (90 BASE) MCG/ACT Inhaler    doxycycline 100 MG capsule         Some of these will need a paper prescription and others can be bought over the counter.  Ask your nurse if you have questions.     Bring a paper prescription for each of these medications     guaiFENesin-codeine 100-10 MG/5ML Soln solution                Primary Care Provider Office Phone # Fax #    Cecelia Gonzalez [phone]       [address]  [place] [address]        Equal Access to Services     [facility]: Hadii aad ku hadasho Soomaali, waaxda luqadaha, qaybta kaalmada adeegyada, waxay idiin haybrandon calderon. So [facility] [phone].    ATENCIÓN: Si habla español, tiene a magaña disposición servicios gratuitos de asistencia lingüística. [facility] [phone].    We comply with applicable federal civil rights laws and [place] laws. We do not discriminate on the basis of race, color, national origin, age, disability, sex, sexual orientation, or gender identity.            Thank you!     Thank you for choosing [facility]  for your care. Our goal is always to provide you with excellent care. Hearing back from our patients is one way we can continue to improve our services. Please take a few minutes to complete the written survey that you may receive in the mail after your visit with us. Thank you!             Your Updated Medication List - Protect others around you: Learn how to safely use, store and throw away your medicines at www.disposemymeds.org.          This list is accurate as of: 11/24/17  9:36 PM.  Always use your most recent med list.                   Brand Name Dispense Instructions for use Diagnosis    * albuterol 108 (90 BASE) MCG/ACT Inhaler    PROAIR HFA/PROVENTIL HFA/VENTOLIN HFA    1 Inhaler    Inhale 2 puffs  into the lungs every 6 hours as needed for shortness of breath / dyspnea or wheezing    Acute bronchospasm       * albuterol 108 (90 BASE) MCG/ACT Inhaler    PROAIR HFA/PROVENTIL HFA/VENTOLIN HFA    1 Inhaler    Inhale 2 puffs into the lungs every 6 hours as needed for shortness of breath / dyspnea or wheezing    Pneumonia of right upper lobe due to infectious organism (H)       doxycycline 100 MG capsule    VIBRAMYCIN    20 capsule    Take 1 capsule (100 mg) by mouth 2 times daily    Pneumonia of right upper lobe due to infectious organism (H)       drospirenone-ethinyl estradiol 3-0.02 MG per tablet    PRATIMA    84 tablet    Take 1 tablet by mouth daily    Acne vulgaris       EPINEPHrine 0.3 MG/0.3ML injection 2-pack    EPIPEN/ADRENACLICK/or ANY BX GENERIC EQUIV    2 each    Inject 0.3 mLs (0.3 mg) into the muscle once as needed for anaphylaxis    Dermatitis due to food taken internally       guaiFENesin-codeine 100-10 MG/5ML Soln solution    ROBITUSSIN AC    240 mL    Take 10 mLs by mouth every 4 hours as needed for cough    Pneumonia of right upper lobe due to infectious organism (H)       hydrOXYzine 25 MG tablet    ATARAX    60 tablet    Take 1-2 tablets (25-50 mg) by mouth every 6 hours as needed for anxiety    TJ (generalized anxiety disorder)       metroNIDAZOLE 0.75 % cream    METROCREAM    45 g    Apply once daily to forehead every morning    Acne vulgaris       MULTIVITAMIN & MINERAL PO           propranolol 10 MG tablet    INDERAL    90 tablet    Take 1 tablet (10 mg) by mouth 3 times daily as needed    Need for prophylactic vaccination and inoculation against influenza       sulfacetamide sodium-sulfur 10-5 % Lotn     60 g    Apply once daily to forehead.    Dermatitis, seborrheic       * tretinoin 0.05 % cream    RETIN-A    45 g    Apply topically At Bedtime To back as directed    Acne vulgaris       * tretinoin 0.025 % cream    RETIN-A    45 g    Use every night on face as directed    Acne vulgaris        * Notice:  This list has 4 medication(s) that are the same as other medications prescribed for you. Read the directions carefully, and ask your doctor or other care provider to review them with you.

## 2017-11-25 LAB
BACTERIA SPEC CULT: NORMAL
SPECIMEN SOURCE: NORMAL

## 2017-11-25 NOTE — PATIENT INSTRUCTIONS

## 2017-11-25 NOTE — NURSING NOTE
"Chief Complaint   Patient presents with     Urgent Care     Fever     Fever started early Tuesday morning along with body aches, feeling tired, cough, chest tightness and left ear pain.        Initial /83 (BP Location: Right arm, Cuff Size: Adult Large)  Pulse 132  Temp 101.3  F (38.5  C) (Oral)  SpO2 97%  Breastfeeding? No Estimated body mass index is 26 kg/(m^2) as calculated from the following:    Height as of 10/6/17: 5' 11\" (1.803 m).    Weight as of 10/6/17: 186 lb 6.4 oz (84.6 kg).  Medication Reconciliation: complete.  HANK Anne      "

## 2017-11-25 NOTE — PROGRESS NOTES
HPI  Farrah Bowen 22 year old female presents with 4 day hx of fever, chills, body aches, chest congestion and cough. Today fever hit 102. It has been responding to ibuprofen but spikes when the meds wear off. She denies much sore throat but has some nasal congestion and left ear pain.     Past Medical History:   Diagnosis Date     Achilles tendonitis      Acne      TJ (generalized anxiety disorder) 10/6/2017     Hearing loss in right ear      Recurrent otitis media     as a child, still sometimes has ear porblems especially on places     No past surgical history on file.     Patient Active Problem List   Diagnosis     Acne vulgaris     Achilles tendonitis     Acne     Hearing loss in right ear     TJ (generalized anxiety disorder)     Allergies   Allergen Reactions     Amoxicillin Rash     Severe rash     Seafood Hives     Swollen Lips       Current Outpatient Prescriptions   Medication     propranolol (INDERAL) 10 MG tablet     hydrOXYzine (ATARAX) 25 MG tablet     tretinoin (RETIN-A) 0.05 % cream     tretinoin (RETIN-A) 0.025 % cream     drospirenone-ethinyl estradiol (PRATIMA) 3-0.02 MG per tablet     metroNIDAZOLE (METROCREAM) 0.75 % cream     sulfacetamide sodium-sulfur 10-5 % LOTN     albuterol (PROAIR HFA/PROVENTIL HFA/VENTOLIN HFA) 108 (90 BASE) MCG/ACT Inhaler     EPINEPHrine (EPIPEN) 0.3 MG/0.3ML injection     Multiple Vitamins-Minerals (MULTIVITAMIN & MINERAL PO)     No current facility-administered medications for this visit.          Review of Systems   Constitutional: Positive for chills, fever and malaise/fatigue.   HENT: Positive for congestion and ear pain.    Eyes: Negative.    Respiratory: Positive for cough. Negative for hemoptysis, sputum production, shortness of breath and wheezing.    Cardiovascular: Negative.    Gastrointestinal: Negative.    Genitourinary: Negative.    Musculoskeletal: Positive for myalgias.   Skin: Negative.    Neurological: Negative.    Endo/Heme/Allergies: Negative.           Physical Exam   Constitutional: She is well-developed, well-nourished, and in no distress. No distress.   /83 (BP Location: Right arm, Cuff Size: Adult Large)  Pulse 132  Temp 101.3  F (38.5  C) (Oral)  SpO2 97%  Breastfeeding? No   HENT:   Head: Normocephalic.   Left TM red without bulging   Eyes: Conjunctivae are normal.   Neck: Normal range of motion.   Cardiovascular: Regular rhythm and normal heart sounds.  Tachycardia present.    Pulmonary/Chest: Effort normal.   Rhonchi anterior chest. No wheezing or rales in lower lobes   Abdominal: Soft. There is no tenderness.   Musculoskeletal: Normal range of motion.   Lymphadenopathy:        Head (right side): No occipital adenopathy present.     She has cervical adenopathy.     She has no axillary adenopathy.   Neurological: She is alert.   Skin: Skin is warm and dry. No rash noted. No erythema.     Results for orders placed or performed in visit on 11/24/17   XR Chest 2 Views    Narrative    CHEST TWO VIEWS   11/24/2017 9:06 PM    HISTORY: Influenza with respiratory manifestation other than  pneumonia. Chest congestion.    COMPARISON:  None.      Impression    IMPRESSION:  Right upper lobe infiltrate consistent with pneumonia.  Follow-up recommended to assure resolution.    CBC with platelets differential   Result Value Ref Range    WBC 7.5 4.0 - 11.0 10e9/L    RBC Count 4.58 3.8 - 5.2 10e12/L    Hemoglobin 13.5 11.7 - 15.7 g/dL    Hematocrit 40.1 35.0 - 47.0 %    MCV 88 78 - 100 fl    MCH 29.5 26.5 - 33.0 pg    MCHC 33.7 31.5 - 36.5 g/dL    RDW 12.2 10.0 - 15.0 %    Platelet Count 223 150 - 450 10e9/L    Diff Method Automated Method     % Neutrophils 71.9 %    % Lymphocytes 16.5 %    % Monocytes 11.1 %    % Eosinophils 0.4 %    % Basophils 0.1 %    Absolute Neutrophil 5.4 1.6 - 8.3 10e9/L    Absolute Lymphocytes 1.2 0.8 - 5.3 10e9/L    Absolute Monocytes 0.8 0.0 - 1.3 10e9/L    Absolute Eosinophils 0.0 0.0 - 0.7 10e9/L    Absolute Basophils 0.0 0.0  - 0.2 10e9/L   Mononucleosis screen   Result Value Ref Range    Mononucleosis Screen Negative NEG^Negative   Strep, Rapid Screen   Result Value Ref Range    Specimen Description Throat     Rapid Strep A Screen       NEGATIVE: No Group A streptococcal antigen detected by immunoassay, await culture report.   Influenza A/B antigen   Result Value Ref Range    Influenza A/B Agn Specimen Swab     Influenza A Negative NEG^Negative    Influenza B Negative NEG^Negative     CHEST X-RAY shows RUL pneumonia    Assessment:  1. Pneumonia of right upper lobe due to infectious organism (H)    2. Influenza with respiratory manifestation other than pneumonia    3. Chest congestion        Plan:  Orders Placed This Encounter     XR Chest 2 Views     CBC with platelets differential     Mononucleosis screen     doxycycline (VIBRAMYCIN) 100 MG capsule     guaiFENesin-codeine (ROBITUSSIN AC) 100-10 MG/5ML SOLN solution     albuterol (PROAIR HFA/PROVENTIL HFA/VENTOLIN HFA) 108 (90 BASE) MCG/ACT Inhaler     Tylenol or Ibuprofen as directed on package for pain or fever  Push oral fluids  Instructions regarding self-care of patient with pneumonia reviewed.   Written instructions provided in after visit summary and reviewed.  Patient instructed to see primary care provider for new or persistent symptoms.   Red flag symptoms reviewed and patient has been instructed to seek emergent   Care at the closest emergency room for evaluation and treatment.   Please contact pharmacy for medication questions.  Patient instructed to take medications as directed on package.      Sharon Pickering APRN, CNP

## 2017-11-26 ENCOUNTER — HEALTH MAINTENANCE LETTER (OUTPATIENT)
Age: 22
End: 2017-11-26

## 2017-11-27 ENCOUNTER — MYC MEDICAL ADVICE (OUTPATIENT)
Dept: FAMILY MEDICINE | Facility: CLINIC | Age: 22
End: 2017-11-27

## 2017-11-27 ENCOUNTER — OFFICE VISIT (OUTPATIENT)
Dept: FAMILY MEDICINE | Facility: CLINIC | Age: 22
End: 2017-11-27
Payer: COMMERCIAL

## 2017-11-27 VITALS
HEART RATE: 121 BPM | DIASTOLIC BLOOD PRESSURE: 78 MMHG | BODY MASS INDEX: 25.24 KG/M2 | TEMPERATURE: 98.9 F | WEIGHT: 181 LBS | SYSTOLIC BLOOD PRESSURE: 119 MMHG | OXYGEN SATURATION: 100 %

## 2017-11-27 DIAGNOSIS — L50.9 HIVES: ICD-10-CM

## 2017-11-27 DIAGNOSIS — J18.9 PNEUMONIA OF RIGHT UPPER LOBE DUE TO INFECTIOUS ORGANISM: Primary | ICD-10-CM

## 2017-11-27 DIAGNOSIS — F41.1 GAD (GENERALIZED ANXIETY DISORDER): ICD-10-CM

## 2017-11-27 PROCEDURE — 99214 OFFICE O/P EST MOD 30 MIN: CPT | Performed by: INTERNAL MEDICINE

## 2017-11-27 RX ORDER — AZITHROMYCIN 250 MG/1
TABLET, FILM COATED ORAL
Qty: 6 TABLET | Refills: 0
Start: 2017-11-27 | End: 2017-12-15

## 2017-11-27 RX ORDER — HYDROXYZINE HYDROCHLORIDE 25 MG/1
25-50 TABLET, FILM COATED ORAL EVERY 6 HOURS PRN
Qty: 60 TABLET | Refills: 1 | Status: SHIPPED | OUTPATIENT
Start: 2017-11-27 | End: 2018-05-15

## 2017-11-27 NOTE — LETTER
[facility]          [address] [place] [address]                            [phone]  Fax: [phone]    Farrah Bowen  [address] [facility] [address]    [id number]    November 27, 2017      To whom it may concern    Please excuse Farrah Bowen from classes from Nov. 27th through Dec. 1st due to illness.    If you have any other questions or concerns please feel free to contact me at anytime.        Sincerely,        Nette Gonzalez MD

## 2017-11-27 NOTE — PROGRESS NOTES
SUBJECTIVE:   Farrah Bowen is a 22 year old female who presents to clinic today for the following health issues:      ED/UC Followup:    Facility:  [facility] urgent clinic   Date of visit: 11/24/2017  Reason for visit: cough, fever   Current Status: pt is having hives, swelling in hands      Diagnosed with RUL pneumonia on 11/24 and given Doxycycline. She woke up two days later with swollen hands and hives all over her body. She had a similar reaction two years ago on a cruise. She thought she had an allergic reaction to seafood but had some allergy testing done which was negative. Her allergist told her it was probably viral.     Her mother tried calling the Urgent care to report hives but got disconnected. They made an appointment to see me today but since Farrah was developing worsening hives and mom was concerned about being off antibiotics she called her brother who is an ER doctor. He prescribed Farrah a course of Azithromycin and she was started on this yesterday. She has not yet taken her dose today.       Problem list and histories reviewed & adjusted, as indicated.  Additional history: as documented    Patient Active Problem List   Diagnosis     Acne vulgaris     Achilles tendonitis     Acne     Hearing loss in right ear     TJ (generalized anxiety disorder)     No past surgical history on file.    Social History   Substance Use Topics     Smoking status: Never Smoker     Smokeless tobacco: Never Used     Alcohol use 0.0 oz/week     0 Standard drinks or equivalent per week      Comment: 3/ month     Family History   Problem Relation Age of Onset     Unknown/Adopted Father      Obesity Mother      Eye Disorder Paternal Grandmother      glaucoma     Breast Cancer Paternal Grandmother      Hypertension Paternal Grandmother      Glaucoma Paternal Grandmother      Anxiety Disorder Paternal Grandmother      CANCER Paternal Grandmother      Other Cancer Paternal Grandmother      Alzheimer Disease Other       Multiple Family Members     Hypertension Paternal Grandfather      CEREBROVASCULAR DISEASE Paternal Grandfather      Cardiac Sudden Death Paternal Grandfather      DIABETES No family hx of      Thyroid Disease No family hx of      Macular Degeneration No family hx of              Reviewed and updated as needed this visit by clinical staff       Reviewed and updated as needed this visit by Provider         ROS:  Constitutional, HEENT, cardiovascular, pulmonary, gi and gu systems are negative, except as otherwise noted.      OBJECTIVE:   /78 (BP Location: Right arm, Cuff Size: Adult Regular)  Pulse 121  Temp 98.9  F (37.2  C) (Oral)  Wt 181 lb (82.1 kg)  LMP 11/14/2017  SpO2 100%  BMI 25.24 kg/m2  Body mass index is 25.24 kg/(m^2).  GENERAL: Appears unwell but is alert and appropriate  NECK: no adenopathy, no asymmetry, masses, or scars and thyroid normal to palpation  RESP: lungs clear to auscultation - no rales, rhonchi or wheezes  CV: regular rate and rhythm, normal S1 S2, no S3 or S4, no murmur, click or rub, no peripheral edema and peripheral pulses strong  SKIN: Hives on face, neck, and chest.     Diagnostic Test Results:  none     ASSESSMENT/PLAN:       1. Pneumonia of right upper lobe due to infectious organism (H)  Agree with treatment course of Azithromycin. Farrah is allergic to PCN so will not add a PCN today. She will call if no improvement.   - azithromycin (ZITHROMAX) 250 MG tablet; Two tablets first day, then one tablet daily for four days.  Dispense: 6 tablet; Refill: 0    2. TJ (generalized anxiety disorder)  - hydrOXYzine (ATARAX) 25 MG tablet; Take 1-2 tablets (25-50 mg) by mouth every 6 hours as needed for anxiety  Dispense: 60 tablet; Refill: 1    3. Hives  Likely secondary to Doxycycline. Adding this to her allergy list. Recommending an antihistamine daily. No evidence of anaphylaxis.       Follow up if no improvement in symptoms      Cecelia Gonzalez MD  [facility]  [place]

## 2017-11-27 NOTE — MR AVS SNAPSHOT
After Visit Summary   11/27/2017    Farrah Bowen    MRN: [id number]           Patient Information     Date Of Birth          1995        Visit Information        Provider Department      11/27/2017 1:20 PM Cecelia Gonzalez MD [facility]        Today's Diagnoses     Pneumonia of right upper lobe due to infectious organism (H)    -  1    TJ (generalized anxiety disorder)        Hives           Follow-ups after your visit        Who to contact     If you have questions or need follow up information about today's clinic visit or your schedule please contact [facility] [place] directly at [phone].  Normal or non-critical lab and imaging results will be communicated to you by MyChart, letter or phone within 4 business days after the clinic has received the results. If you do not hear from us within 7 days, please contact the clinic through [employer] or phone. If you have a critical or abnormal lab result, we will notify you by phone as soon as possible.  Submit refill requests through [employer] or call your pharmacy and they will forward the refill request to us. Please allow 3 business days for your refill to be completed.          Additional Information About Your Visit        MyChart Information     [employer] gives you secure access to your electronic health record. If you see a primary care provider, you can also send messages to your care team and make appointments. If you have questions, please call your primary care clinic.  If you do not have a primary care provider, please call [phone] and they will assist you.        Care EveryWhere ID     This is your Care EveryWhere ID. This could be used by other organizations to access your [facility] medical records  [id number]        Your Vitals Were     Pulse Temperature Last Period Pulse Oximetry BMI (Body Mass Index)       121 98.9  F (37.2  C) (Oral) 11/14/2017 100% 25.24 kg/m2        Blood Pressure from Last 3  Encounters:   11/27/17 119/78   11/24/17 122/83   10/06/17 118/67    Weight from Last 3 Encounters:   11/27/17 181 lb (82.1 kg)   10/06/17 186 lb 6.4 oz (84.6 kg)   05/12/17 190 lb (86.2 kg)              Today, you had the following     No orders found for display         Today's Medication Changes          These changes are accurate as of: 11/27/17  1:56 PM.  If you have any questions, ask your nurse or doctor.               These medicines have changed or have updated prescriptions.        Dose/Directions    * ZITHROMAX PO   This may have changed:  Another medication with the same name was added. Make sure you understand how and when to take each.   Changed by:  Cecelia Gonzalez MD        Dose:  250 mg   Take 250 mg by mouth   Refills:  0       * azithromycin 250 MG tablet   Commonly known as:  ZITHROMAX   This may have changed:  You were already taking a medication with the same name, and this prescription was added. Make sure you understand how and when to take each.   Used for:  Pneumonia of right upper lobe due to infectious organism (H)   Changed by:  Cecelia Gonzalez MD        Two tablets first day, then one tablet daily for four days.   Quantity:  6 tablet   Refills:  0       * Notice:  This list has 2 medication(s) that are the same as other medications prescribed for you. Read the directions carefully, and ask your doctor or other care provider to review them with you.         Where to get your medicines      These medications were [place] [facility] [place] [facility] [place] [address] [place] [address]     Phone:  [phone]     hydrOXYzine 25 MG tablet         Some of these will need a paper prescription and others can be bought over the counter.  Ask your nurse if you have questions.     You don't need a prescription for these medications     azithromycin 250 MG tablet                Primary Care Provider Office Phone # Fax #    Cecelia  Darling Gonzalez [phone]       [address]  [place] [address]        Equal Access to Services     JOSEPH SAWYER : Hadii aad ku hadadrienneo Sojosie, waaxda luqadaha, qaybta kaalmada aderashad, rafael gantkristi yumiko. So [facility] [phone].    ATENCIÓN: Si habla español, tiene a magaña disposición servicios gratuitos de asistencia lingüística. Llame al [phone].    We comply with applicable federal civil rights laws and [place] laws. We do not discriminate on the basis of race, color, national origin, age, disability, sex, sexual orientation, or gender identity.            Thank you!     Thank you for choosing [facility] [place]  for your care. Our goal is always to provide you with excellent care. Hearing back from our patients is one way we can continue to improve our services. Please take a few minutes to complete the written survey that you may receive in the mail after your visit with us. Thank you!             Your Updated Medication List - Protect others around you: Learn how to safely use, store and throw away your medicines at www.disposemymeds.org.          This list is accurate as of: 11/27/17  1:56 PM.  Always use your most recent med list.                   Brand Name Dispense Instructions for use Diagnosis    albuterol 108 (90 BASE) MCG/ACT Inhaler    PROAIR HFA/PROVENTIL HFA/VENTOLIN HFA    1 Inhaler    Inhale 2 puffs into the lungs every 6 hours as needed for shortness of breath / dyspnea or wheezing    Acute bronchospasm       drospirenone-ethinyl estradiol 3-0.02 MG per tablet    PRATIMA    84 tablet    Take 1 tablet by mouth daily    Acne vulgaris       EPINEPHrine 0.3 MG/0.3ML injection 2-pack    EPIPEN/ADRENACLICK/or ANY BX GENERIC EQUIV    2 each    Inject 0.3 mLs (0.3 mg) into the muscle once as needed for anaphylaxis    Dermatitis due to food taken internally       guaiFENesin-codeine 100-10 MG/5ML Soln solution    ROBITUSSIN AC    240 mL     Take 10 mLs by mouth every 4 hours as needed for cough    Pneumonia of right upper lobe due to infectious organism (H)       hydrOXYzine 25 MG tablet    ATARAX    60 tablet    Take 1-2 tablets (25-50 mg) by mouth every 6 hours as needed for anxiety    TJ (generalized anxiety disorder)       metroNIDAZOLE 0.75 % cream    METROCREAM    45 g    Apply once daily to forehead every morning    Acne vulgaris       MULTIVITAMIN & MINERAL PO           propranolol 10 MG tablet    INDERAL    90 tablet    Take 1 tablet (10 mg) by mouth 3 times daily as needed    Need for prophylactic vaccination and inoculation against influenza       sulfacetamide sodium-sulfur 10-5 % Lotn     60 g    Apply once daily to forehead.    Dermatitis, seborrheic       * tretinoin 0.05 % cream    RETIN-A    45 g    Apply topically At Bedtime To back as directed    Acne vulgaris       * tretinoin 0.025 % cream    RETIN-A    45 g    Use every night on face as directed    Acne vulgaris       * ZITHROMAX PO      Take 250 mg by mouth        * azithromycin 250 MG tablet    ZITHROMAX    6 tablet    Two tablets first day, then one tablet daily for four days.    Pneumonia of right upper lobe due to infectious organism (H)       * Notice:  This list has 4 medication(s) that are the same as other medications prescribed for you. Read the directions carefully, and ask your doctor or other care provider to review them with you.

## 2017-11-27 NOTE — TELEPHONE ENCOUNTER
Letter written for patient to miss school this week. Please notify patient. Her mother can  at her convenience.

## 2017-11-27 NOTE — TELEPHONE ENCOUNTER
Please see [facility] message below and advise.   Laine Quintana RN   [facility] - Triage

## 2017-12-15 ENCOUNTER — RADIANT APPOINTMENT (OUTPATIENT)
Dept: GENERAL RADIOLOGY | Facility: CLINIC | Age: 22
End: 2017-12-15
Attending: PHYSICIAN ASSISTANT
Payer: COMMERCIAL

## 2017-12-15 ENCOUNTER — OFFICE VISIT (OUTPATIENT)
Dept: FAMILY MEDICINE | Facility: CLINIC | Age: 22
End: 2017-12-15
Payer: COMMERCIAL

## 2017-12-15 VITALS
OXYGEN SATURATION: 97 % | DIASTOLIC BLOOD PRESSURE: 67 MMHG | HEART RATE: 101 BPM | HEIGHT: 71 IN | SYSTOLIC BLOOD PRESSURE: 129 MMHG | BODY MASS INDEX: 25.48 KG/M2 | TEMPERATURE: 99 F | RESPIRATION RATE: 18 BRPM | WEIGHT: 182 LBS

## 2017-12-15 DIAGNOSIS — J01.01 ACUTE RECURRENT MAXILLARY SINUSITIS: ICD-10-CM

## 2017-12-15 DIAGNOSIS — J18.9 PNEUMONIA OF RIGHT UPPER LOBE DUE TO INFECTIOUS ORGANISM: ICD-10-CM

## 2017-12-15 DIAGNOSIS — J18.9 PNEUMONIA OF RIGHT UPPER LOBE DUE TO INFECTIOUS ORGANISM: Primary | ICD-10-CM

## 2017-12-15 PROCEDURE — 99214 OFFICE O/P EST MOD 30 MIN: CPT | Performed by: PHYSICIAN ASSISTANT

## 2017-12-15 PROCEDURE — 71020 XR CHEST 2 VW: CPT

## 2017-12-15 RX ORDER — CEFDINIR 300 MG/1
300 CAPSULE ORAL 2 TIMES DAILY
Qty: 14 CAPSULE | Refills: 0 | Status: SHIPPED | OUTPATIENT
Start: 2017-12-15 | End: 2017-12-22

## 2017-12-15 ASSESSMENT — ANXIETY QUESTIONNAIRES
7. FEELING AFRAID AS IF SOMETHING AWFUL MIGHT HAPPEN: NEARLY EVERY DAY
3. WORRYING TOO MUCH ABOUT DIFFERENT THINGS: MORE THAN HALF THE DAYS
5. BEING SO RESTLESS THAT IT IS HARD TO SIT STILL: NOT AT ALL
2. NOT BEING ABLE TO STOP OR CONTROL WORRYING: MORE THAN HALF THE DAYS
GAD7 TOTAL SCORE: 15
IF YOU CHECKED OFF ANY PROBLEMS ON THIS QUESTIONNAIRE, HOW DIFFICULT HAVE THESE PROBLEMS MADE IT FOR YOU TO DO YOUR WORK, TAKE CARE OF THINGS AT HOME, OR GET ALONG WITH OTHER PEOPLE: VERY DIFFICULT
1. FEELING NERVOUS, ANXIOUS, OR ON EDGE: MORE THAN HALF THE DAYS
6. BECOMING EASILY ANNOYED OR IRRITABLE: NEARLY EVERY DAY

## 2017-12-15 ASSESSMENT — PATIENT HEALTH QUESTIONNAIRE - PHQ9
5. POOR APPETITE OR OVEREATING: NEARLY EVERY DAY
SUM OF ALL RESPONSES TO PHQ QUESTIONS 1-9: 10

## 2017-12-15 NOTE — MR AVS SNAPSHOT
After Visit Summary   12/15/2017    Farrah Bowen    MRN: [id number]           Patient Information     Date Of Birth          1995        Visit Information        Provider Department      12/15/2017 1:20 PM Franky Mesa PA-C [facility]        Today's Diagnoses     Pneumonia of right upper lobe due to infectious organism (H)    -  1    Acute recurrent maxillary sinusitis           Follow-ups after your visit        Follow-up notes from your care team     Return if symptoms worsen or fail to improve.      Who to contact     If you have questions or need follow up information about today's clinic visit or your schedule please contact [facility] directly at [phone].  Normal or non-critical lab and imaging results will be communicated to you by onefortyhart, letter or phone within 4 business days after the clinic has received the results. If you do not hear from us within 7 days, please contact the clinic through onefortyhart or phone. If you have a critical or abnormal lab result, we will notify you by phone as soon as possible.  Submit refill requests through iZotope or call your pharmacy and they will forward the refill request to us. Please allow 3 business days for your refill to be completed.          Additional Information About Your Visit        MyChart Information     iZotope gives you secure access to your electronic health record. If you see a primary care provider, you can also send messages to your care team and make appointments. If you have questions, please call your primary care clinic.  If you do not have a primary care provider, please call [phone] and they will assist you.        Care EveryWhere ID     This is your Care EveryWhere ID. This could be used by other organizations to access your [employer] medical records  [id number]        Your Vitals Were     Pulse Temperature Respirations Height Last Period Pulse Oximetry    101 99  F  "(37.2  C) (Tympanic) 18 5' 11\" (1.803 m) 11/14/2017 97%    BMI (Body Mass Index)                   25.38 kg/m2            Blood Pressure from Last 3 Encounters:   12/15/17 129/67   11/27/17 119/78   11/24/17 122/83    Weight from Last 3 Encounters:   12/15/17 182 lb (82.6 kg)   11/27/17 181 lb (82.1 kg)   10/06/17 186 lb 6.4 oz (84.6 kg)                 Today's Medication Changes          These changes are accurate as of: 12/15/17  1:57 PM.  If you have any questions, ask your nurse or doctor.               Start taking these medicines.        Dose/Directions    cefdinir 300 MG capsule   Commonly known as:  OMNICEF   Used for:  Acute recurrent maxillary sinusitis   Started by:  Franky Mesa PA-C        Dose:  300 mg   Take 1 capsule (300 mg) by mouth 2 times daily for 7 days   Quantity:  14 capsule   Refills:  0            Where to get your medicines      These medications were sent to [facility] [address] [place] [address] [address] AT [facility] [address], [facility] [address]     Phone:  [phone]     cefdinir 300 MG capsule                Primary Care Provider Office Phone # Fax #    Cecelia Gonzalez [phone]       [address] [facility] [address]  [place] [address]        Equal Access to Services     [facility]AN AH: Hadii aad ku hadasho Soomaali, waaxda luqadaha, qaybta kaalmada adeegyada, waxay julianin haybrandon calderon. So [facility] [phone].    ATENCIÓN: Si habla español, tiene a magaña disposición servicios gratuitos de asistencia lingüística. Llame al [phone].    We comply with applicable federal civil rights laws and [place] laws. We do not discriminate on the basis of race, color, national origin, age, disability, sex, sexual orientation, or gender identity.            Thank you!     Thank you for choosing [facility] [place]  for your care. Our goal is always to provide you with excellent care. Hearing back from " our patients is one way we can continue to improve our services. Please take a few minutes to complete the written survey that you may receive in the mail after your visit with us. Thank you!             Your Updated Medication List - Protect others around you: Learn how to safely use, store and throw away your medicines at www.disposemymeds.org.          This list is accurate as of: 12/15/17  1:57 PM.  Always use your most recent med list.                   Brand Name Dispense Instructions for use Diagnosis    albuterol 108 (90 BASE) MCG/ACT Inhaler    PROAIR HFA/PROVENTIL HFA/VENTOLIN HFA    1 Inhaler    Inhale 2 puffs into the lungs every 6 hours as needed for shortness of breath / dyspnea or wheezing    Acute bronchospasm       cefdinir 300 MG capsule    OMNICEF    14 capsule    Take 1 capsule (300 mg) by mouth 2 times daily for 7 days    Acute recurrent maxillary sinusitis       drospirenone-ethinyl estradiol 3-0.02 MG per tablet    PRATIMA    84 tablet    Take 1 tablet by mouth daily    Acne vulgaris       EPINEPHrine 0.3 MG/0.3ML injection 2-pack    EPIPEN/ADRENACLICK/or ANY BX GENERIC EQUIV    2 each    Inject 0.3 mLs (0.3 mg) into the muscle once as needed for anaphylaxis    Dermatitis due to food taken internally       hydrOXYzine 25 MG tablet    ATARAX    60 tablet    Take 1-2 tablets (25-50 mg) by mouth every 6 hours as needed for anxiety    TJ (generalized anxiety disorder)       metroNIDAZOLE 0.75 % cream    METROCREAM    45 g    Apply once daily to forehead every morning    Acne vulgaris       MULTIVITAMIN & MINERAL PO           propranolol 10 MG tablet    INDERAL    90 tablet    Take 1 tablet (10 mg) by mouth 3 times daily as needed    Need for prophylactic vaccination and inoculation against influenza       sulfacetamide sodium-sulfur 10-5 % Lotn     60 g    Apply once daily to forehead.    Dermatitis, seborrheic       * tretinoin 0.05 % cream    RETIN-A    45 g    Apply topically At Bedtime To back as  directed    Acne vulgaris       * tretinoin 0.025 % cream    RETIN-A    45 g    Use every night on face as directed    Acne vulgaris       * Notice:  This list has 2 medication(s) that are the same as other medications prescribed for you. Read the directions carefully, and ask your doctor or other care provider to review them with you.

## 2017-12-15 NOTE — PROGRESS NOTES
SUBJECTIVE:   Farrah Bowen is a 22 year old female who presents to clinic today for the following health issues:    Acute Illness   Acute illness concerns: Sinus Problem  Onset: x 7 days. Pt was dx with pnemonia three weeks ago. Sx seem to have improved, just sinus more bothersome.    Fever: YES- low grade ( subjective)  Intermittent, 99 today    Chills/Sweats: YES- both    Headache (location?): YES    Sinus Pressure:YES, maxillary and frontal bialterally    Conjunctivitis:  no    Ear Pain: YES- mostly in the left ear, improving    Rhinorrhea: YES    Congestion: YES    Sore Throat: no      Cough: YES - sometimes, improved    Wheeze: no     Decreased Appetite: YES    Nausea: no     Vomiting: no     Diarrhea: YES    Dysuria/Freq.: no     Fatigue/Achiness: YES- Fatigue    Sick/Strep Exposure: YES- School     Therapies Tried and outcome: All done with antibiotics, ibuprofen and sudafed - doesn't do anything.      Diagnosed with RUQ PNA and left AOM on 11/24/2017.  Treated with full course of azithromycin.  Cough and SOB are much improved, but over the past week she has developed maxillary and frontal sinus pressure,facial pain and dental pain that is bilateral.  She notes a continued subjective low grade fever.  No fatigue or myalgias. She has tried sudafed, and Flonase without relief of her sinus congestion.  Her ear pain is improved, but not resolved.  She would like her ear rechecked        Problem list and histories reviewed & adjusted, as indicated.  Additional history: as documented    Patient Active Problem List   Diagnosis     Acne vulgaris     Achilles tendonitis     Acne     Hearing loss in right ear     TJ (generalized anxiety disorder)     Pneumonia of right upper lobe due to infectious organism (H)     No past surgical history on file.    Social History   Substance Use Topics     Smoking status: Never Smoker     Smokeless tobacco: Never Used     Alcohol use 0.0 oz/week     0 Standard drinks or equivalent  per week      Comment: 3/ month     Family History   Problem Relation Age of Onset     Unknown/Adopted Father      Obesity Mother      Eye Disorder Paternal Grandmother      glaucoma     Breast Cancer Paternal Grandmother      Hypertension Paternal Grandmother      Glaucoma Paternal Grandmother      Anxiety Disorder Paternal Grandmother      CANCER Paternal Grandmother      Other Cancer Paternal Grandmother      Alzheimer Disease Other      Multiple Family Members     Hypertension Paternal Grandfather      CEREBROVASCULAR DISEASE Paternal Grandfather      Cardiac Sudden Death Paternal Grandfather      DIABETES No family hx of      Thyroid Disease No family hx of      Macular Degeneration No family hx of          Current Outpatient Prescriptions   Medication Sig Dispense Refill     cefdinir (OMNICEF) 300 MG capsule Take 1 capsule (300 mg) by mouth 2 times daily for 7 days 14 capsule 0     hydrOXYzine (ATARAX) 25 MG tablet Take 1-2 tablets (25-50 mg) by mouth every 6 hours as needed for anxiety 60 tablet 1     propranolol (INDERAL) 10 MG tablet Take 1 tablet (10 mg) by mouth 3 times daily as needed 90 tablet 3     tretinoin (RETIN-A) 0.05 % cream Apply topically At Bedtime To back as directed 45 g 11     tretinoin (RETIN-A) 0.025 % cream Use every night on face as directed 45 g 11     drospirenone-ethinyl estradiol (PRATIMA) 3-0.02 MG per tablet Take 1 tablet by mouth daily 84 tablet 3     metroNIDAZOLE (METROCREAM) 0.75 % cream Apply once daily to forehead every morning 45 g 1     sulfacetamide sodium-sulfur 10-5 % LOTN Apply once daily to forehead. 60 g 5     albuterol (PROAIR HFA/PROVENTIL HFA/VENTOLIN HFA) 108 (90 BASE) MCG/ACT Inhaler Inhale 2 puffs into the lungs every 6 hours as needed for shortness of breath / dyspnea or wheezing 1 Inhaler 1     EPINEPHrine (EPIPEN) 0.3 MG/0.3ML injection Inject 0.3 mLs (0.3 mg) into the muscle once as needed for anaphylaxis 2 each 1     Multiple Vitamins-Minerals (MULTIVITAMIN &  "MINERAL PO)        Allergies   Allergen Reactions     Amoxicillin Rash     Severe rash     Doxycycline Hives     Seafood Hives     Swollen Lips           Reviewed and updated as needed this visit by clinical staff     Reviewed and updated as needed this visit by Provider         ROS:  Constitutional, HEENT, cardiovascular, pulmonary, gi and gu systems are negative, except as otherwise noted.      OBJECTIVE:   /67 (BP Location: Right arm, Patient Position: Chair, Cuff Size: Adult Regular)  Pulse 101  Temp 99  F (37.2  C) (Tympanic)  Resp 18  Ht 5' 11\" (1.803 m)  Wt 182 lb (82.6 kg)  LMP 11/14/2017  SpO2 97%  BMI 25.38 kg/m2  Body mass index is 25.38 kg/(m^2).  GENERAL: healthy, alert and no distress  EYES: Eyes grossly normal to inspection, PERRL and conjunctivae and sclerae normal  HENT: ear canals and TM's normal, nose and mouth without ulcers or lesions, bilateral maxillary and frontal TTP  NECK: no adenopathy  RESP: lungs clear to auscultation - no rales, rhonchi or wheezes  CV: regular rate and rhythm, normal S1 S2, no S3 or S4, no murmur, click or rub, no peripheral edema     Diagnostic Test Results:  none     ASSESSMENT/PLAN:       1. Acute recurrent maxillary sinusitis  Due to allergies, will treat sinusitis with omnicef today, advise follow up if no improvement in 1 week, continue supportive care with Flonase/netti pot.  - cefdinir (OMNICEF) 300 MG capsule; Take 1 capsule (300 mg) by mouth 2 times daily for 7 days  Dispense: 14 capsule; Refill: 0    2. Pneumonia of right upper lobe due to infectious organism (H)  Chest x ray completed to assess whether infiltrate has resolved.  Per my read, Previously noted RUL PNA is no longer visualized.  She is symptomatically much improved, no further treatment required.  - XR Chest 2 Views; Future    Follow-Up: As above    Franky Mesa PA-C  [facility]  "

## 2017-12-15 NOTE — NURSING NOTE
"Chief Complaint   Patient presents with     Sinus Problem     x 7 days        Initial /67 (BP Location: Right arm, Patient Position: Chair, Cuff Size: Adult Regular)  Pulse 101  Temp 99  F (37.2  C) (Tympanic)  Resp 18  Ht 5' 11\" (1.803 m)  Wt 182 lb (82.6 kg)  LMP 11/14/2017  SpO2 97%  BMI 25.38 kg/m2 Estimated body mass index is 25.38 kg/(m^2) as calculated from the following:    Height as of this encounter: 5' 11\" (1.803 m).    Weight as of this encounter: 182 lb (82.6 kg).  Medication Reconciliation: complete    Kanwal Rose MA  "

## 2017-12-16 ASSESSMENT — ANXIETY QUESTIONNAIRES: GAD7 TOTAL SCORE: 15

## 2018-01-02 ENCOUNTER — OFFICE VISIT (OUTPATIENT)
Dept: FAMILY MEDICINE | Facility: CLINIC | Age: 23
End: 2018-01-02
Payer: COMMERCIAL

## 2018-01-02 VITALS
WEIGHT: 181 LBS | DIASTOLIC BLOOD PRESSURE: 60 MMHG | SYSTOLIC BLOOD PRESSURE: 100 MMHG | HEART RATE: 101 BPM | TEMPERATURE: 98.6 F | BODY MASS INDEX: 25.24 KG/M2

## 2018-01-02 DIAGNOSIS — F41.1 GAD (GENERALIZED ANXIETY DISORDER): Primary | ICD-10-CM

## 2018-01-02 PROBLEM — J18.9 PNEUMONIA OF RIGHT UPPER LOBE DUE TO INFECTIOUS ORGANISM: Status: RESOLVED | Noted: 2017-11-27 | Resolved: 2018-01-02

## 2018-01-02 PROCEDURE — 99213 OFFICE O/P EST LOW 20 MIN: CPT | Performed by: INTERNAL MEDICINE

## 2018-01-02 RX ORDER — CITALOPRAM HYDROBROMIDE 10 MG/1
10 TABLET ORAL DAILY
Qty: 90 TABLET | Refills: 1 | Status: SHIPPED | OUTPATIENT
Start: 2018-01-02 | End: 2018-05-10

## 2018-01-02 NOTE — PROGRESS NOTES
SUBJECTIVE:   Farrah Bowen is a 22 year old female who presents to clinic today for the following health issues:      Medication Followup of propranolol and hydroxyzine     Taking Medication as prescribed: yes    Side Effects:  None    Medication Helping Symptoms:  Yes, but feels she may need to take something daily      Farrah is here for follow-up of anxiety.  2 months ago she was started on propranolol and hydroxyzine as needed by her PCP Dr. Gonzalez.  She was hesitant to take a daily medication.  However now she finds herself taking the propranolol every day.  She is wondering whether starting an SSRI might make sense at this point. She takes the hydroxyzine prn at night.     She is home for a few weeks on winter break.  She is going to school at [employer] in [place].        Reviewed and updated as needed this visit by clinical staffTobacco  Allergies  Meds       Reviewed and updated as needed this visit by Provider         ROS:  Psych reviewed,  otherwise negative unless noted above.       OBJECTIVE:     /60 (BP Location: Left arm, Patient Position: Chair, Cuff Size: Adult Regular)  Pulse 101  Temp 98.6  F (37  C) (Tympanic)  Wt 181 lb (82.1 kg)  LMP 12/30/2017  BMI 25.24 kg/m2  Body mass index is 25.24 kg/(m^2).  GENERAL: healthy, alert and no distress  PSYCH: mentation appears normal and affect normal/bright, anxious appearing       Diagnostic Test Results:  none     ASSESSMENT/PLAN:           ICD-10-CM    1. TJ (generalized anxiety disorder) F41.1 citalopram (CELEXA) 10 MG tablet     We will start citalopram 10 mg daily, she would like to start with the lowest dose.  Reviewed common side effects.  She can continue to take the propranolol and hydroxyzine as needed if helpful.    She will follow-up when she is home on spring break in March, sooner as needed via Syniversehart.      Rach Mcbride MD  [facility]

## 2018-01-02 NOTE — MR AVS SNAPSHOT
After Visit Summary   1/2/2018    Farrah Bowen    MRN: [id number]           Patient Information     Date Of Birth          1995        Visit Information        Provider Department      1/2/2018 2:40 PM Rach Mcbride MD [facility]        Today's Diagnoses     TJ (generalized anxiety disorder)    -  1       Follow-ups after your visit        Follow-up notes from your care team     Return in about 2 months (around 3/2/2018) for anxiety .      Your next 10 appointments already scheduled     Mar 12, 2018  2:55 PM CDT   (Arrive by 2:40 PM)   Return Allergy with Sahil Sarmiento MD   [facility] ([facility])    [address]  [address] [facility] [address] [phone]           Do not take anti-histamines or Zantac for seven day prior to your appointment.              Who to contact     If you have questions or need follow up information about today's clinic visit or your schedule please contact [facility] [place] directly at [phone].  Normal or non-critical lab and imaging results will be communicated to you by Beanuphart, letter or phone within 4 business days after the clinic has received the results. If you do not hear from us within 7 days, please contact the clinic through Beanuphart or phone. If you have a critical or abnormal lab result, we will notify you by phone as soon as possible.  Submit refill requests through AvidBiotics or call your pharmacy and they will forward the refill request to us. Please allow 3 business days for your refill to be completed.          Additional Information About Your Visit        Beanuphart Information     AvidBiotics gives you secure access to your electronic health record. If you see a primary care provider, you can also send messages to your care team and make appointments. If you have questions, please call your primary care clinic.  If you do not have  a primary care provider, please call [phone] and they will assist you.        Care EveryWhere ID     This is your Care EveryWhere ID. This could be used by other organizations to access your [facility] medical records  [id number]        Your Vitals Were     Pulse Temperature Last Period BMI (Body Mass Index)          101 98.6  F (37  C) (Tympanic) 12/30/2017 25.24 kg/m2         Blood Pressure from Last 3 Encounters:   01/02/18 100/60   12/15/17 129/67   11/27/17 119/78    Weight from Last 3 Encounters:   01/02/18 181 lb (82.1 kg)   12/15/17 182 lb (82.6 kg)   11/27/17 181 lb (82.1 kg)              Today, you had the following     No orders found for display         Today's Medication Changes          These changes are accurate as of: 1/2/18  2:54 PM.  If you have any questions, ask your nurse or doctor.               Start taking these medicines.        Dose/Directions    citalopram 10 MG tablet   Commonly known as:  celeXA   Used for:  TJ (generalized anxiety disorder)   Started by:  Rach Mcbride MD        Dose:  10 mg   Take 1 tablet (10 mg) by mouth daily   Quantity:  90 tablet   Refills:  1            Where to get your medicines      These medications were sent to [facility] [id number] [place] [address] [address] AT [facility] [address], [facility] [address]     Phone:  [phone]     citalopram 10 MG tablet                Primary Care Provider Office Phone # Fax #    Cecelia Gonzalez [phone]       [address] [facility] [place] [address]        Equal Access to Services     [facility] Center[facility] AH: Hadii jerman Leroy, waaxda luqadaha, qaybta kaalmada melissa, rafael calderon. So [facility] [phone].    ATENCIÓN: Si habla español, tiene a magaña disposición servicios gratuitos de asistencia lingüística. Llame al [phone].    We comply with applicable federal civil rights laws and [place] laws. We do  not discriminate on the basis of race, color, national origin, age, disability, sex, sexual orientation, or gender identity.            Thank you!     Thank you for choosing [facility] [place]  for your care. Our goal is always to provide you with excellent care. Hearing back from our patients is one way we can continue to improve our services. Please take a few minutes to complete the written survey that you may receive in the mail after your visit with us. Thank you!             Your Updated Medication List - Protect others around you: Learn how to safely use, store and throw away your medicines at www.disposemymeds.org.          This list is accurate as of: 1/2/18  2:54 PM.  Always use your most recent med list.                   Brand Name Dispense Instructions for use Diagnosis    albuterol 108 (90 BASE) MCG/ACT Inhaler    PROAIR HFA/PROVENTIL HFA/VENTOLIN HFA    1 Inhaler    Inhale 2 puffs into the lungs every 6 hours as needed for shortness of breath / dyspnea or wheezing    Acute bronchospasm       citalopram 10 MG tablet    celeXA    90 tablet    Take 1 tablet (10 mg) by mouth daily    TJ (generalized anxiety disorder)       drospirenone-ethinyl estradiol 3-0.02 MG per tablet    PRATIMA    84 tablet    Take 1 tablet by mouth daily    Acne vulgaris       EPINEPHrine 0.3 MG/0.3ML injection 2-pack    EPIPEN/ADRENACLICK/or ANY BX GENERIC EQUIV    2 each    Inject 0.3 mLs (0.3 mg) into the muscle once as needed for anaphylaxis    Dermatitis due to food taken internally       hydrOXYzine 25 MG tablet    ATARAX    60 tablet    Take 1-2 tablets (25-50 mg) by mouth every 6 hours as needed for anxiety    TJ (generalized anxiety disorder)       metroNIDAZOLE 0.75 % cream    METROCREAM    45 g    Apply once daily to forehead every morning    Acne vulgaris       MULTIVITAMIN & MINERAL PO           propranolol 10 MG tablet    INDERAL    90 tablet    Take 1 tablet (10 mg) by mouth 3 times daily as needed    Need  for prophylactic vaccination and inoculation against influenza       sulfacetamide sodium-sulfur 10-5 % Lotn     60 g    Apply once daily to forehead.    Dermatitis, seborrheic       * tretinoin 0.05 % cream    RETIN-A    45 g    Apply topically At Bedtime To back as directed    Acne vulgaris       * tretinoin 0.025 % cream    RETIN-A    45 g    Use every night on face as directed    Acne vulgaris       * Notice:  This list has 2 medication(s) that are the same as other medications prescribed for you. Read the directions carefully, and ask your doctor or other care provider to review them with you.

## 2018-01-05 ENCOUNTER — OFFICE VISIT (OUTPATIENT)
Dept: OPHTHALMOLOGY | Facility: CLINIC | Age: 23
End: 2018-01-05
Payer: COMMERCIAL

## 2018-01-05 DIAGNOSIS — H52.13 MYOPIA OF BOTH EYES: Primary | ICD-10-CM

## 2018-01-05 ASSESSMENT — REFRACTION_WEARINGRX
SPECS_TYPE: SVL
OD_SPHERE: -1.50
OS_SPHERE: -1.75
OS_CYLINDER: SPHERE
OD_CYLINDER: SPHERE

## 2018-01-05 ASSESSMENT — TONOMETRY
IOP_METHOD: ICARE
OD_IOP_MMHG: 22
OS_IOP_MMHG: 21

## 2018-01-05 ASSESSMENT — REFRACTION_CURRENTRX
OD_BASECURVE: 8.70
OS_CYLINDER: SPHERE
OS_BRAND: ALCON DAILIES AQUA COMFORT PLUS BC 8.7, D 14.0
OS_DIAMETER: 14.0
OD_DIAMETER: 14.0
OS_BASECURVE: 8.70
OS_SPHERE: -1.50
OD_CYLINDER: SPHERE
OD_SPHERE: -1.50
OD_BRAND: ALCON DAILIES AQUA COMFORT PLUS BC 8.7, D 14.0

## 2018-01-05 ASSESSMENT — REFRACTION_MANIFEST
OD_CYLINDER: SPHERE
OS_SPHERE: -1.25
OS_CYLINDER: SPHERE
OD_SPHERE: -1.50

## 2018-01-05 ASSESSMENT — SLIT LAMP EXAM - LIDS
COMMENTS: NORMAL
COMMENTS: NORMAL

## 2018-01-05 ASSESSMENT — CONF VISUAL FIELD
METHOD: COUNTING FINGERS
OS_NORMAL: 1
OD_NORMAL: 1

## 2018-01-05 ASSESSMENT — VISUAL ACUITY
METHOD: SNELLEN - LINEAR
CORRECTION_TYPE: GLASSES
OD_CC: J1+
OD_CC: 20/20
OS_CC: 20/20
OS_CC: J1+

## 2018-01-05 ASSESSMENT — EXTERNAL EXAM - RIGHT EYE: OD_EXAM: NORMAL

## 2018-01-05 ASSESSMENT — CUP TO DISC RATIO
OS_RATIO: 0.3
OD_RATIO: 0.3

## 2018-01-05 ASSESSMENT — EXTERNAL EXAM - LEFT EYE: OS_EXAM: NORMAL

## 2018-01-05 NOTE — NURSING NOTE
Chief Complaints and History of Present Illnesses   Patient presents with     Annual Eye Exam     HPI    Affected eye(s):  Both   Symptoms:     No blurred vision         Do you have eye pain now?:  No      Comments:  CL wearer:    Wearing same MR from last visit    Patient wears CLs 9am-11pm hours/day, 5 days per week.    Patient gives themselves days off of CLs: sometimes on weekends 1-2 days per week    Disposal schedule for CLs is daiNameMedia    Patient is disposing of CLs correctly    Patient naps in CLs: sometimes for a few hours by accident, once per month    Patient sleeps in CLs overnight: no    Patient swims in CLs: yes, but throwing out afterwords     Patient is seeing well in CLs: yes    Patient is comfortable in CLS: yes    No glasses issues, recently got new pair. Last 24 hours a little strain looking to the right in the right eye. Uses ATs when FBS happens but not often.  Eufemia Ramesh COT 9:00 AM January 5, 2018

## 2018-01-05 NOTE — PROGRESS NOTES
Assessment/Plan  (H52.13) Myopia of both eyes  (primary encounter diagnosis)  Plan: SRx and CL Rx updated and released. Patient should RTC with new flashes/floaters/curtain over vision or with any other vision changes. Will monitor in 1-2 years with complete exam.     Contact Lens Billing  V-Code:  - Soft spherical  Final Contact Lens Rx      Brand Base Curve Diameter Sphere Cylinder   Right Cip[employer] D[employer]ies Aqua Comfort Plus BC 8.7, D 14.0 8.70 14.0 -1.50 Sphere   Left [employer] Aqua Comfort Plus BC 8.7, D 14.0 8.70 14.0 -1.50 Sphere       Expiration Date:  1/6/2020    Replacement:  Daily    Wearing Schedule:  Daily wear         Price per Unit: $75 fitting fee     These are for cosmetic contact lenses.    Encounter Diagnosis   Name Primary?     Myopia of both eyes Yes        Date of last eye exam: Today, 1/5/18      Complete documentation of historical and exam elements from today's encounter can  be found in the full encounter summary report (not reduplicated in this progress  note). I personally obtained the chief complaint(s) and history of present illness. I  confirmed and edited as necessary the review of systems, past medical/surgical  history, family history, social history, and examination findings as documented by  others; and I examined the patient myself. I personally reviewed the relevant tests,  images, and reports as documented above. I formulated and edited as necessary the  assessment and plan and discussed the findings and management plan with the  patient and family.    Rod Carey OD

## 2018-01-05 NOTE — MR AVS SNAPSHOT
After Visit Summary   1/5/2018    Farrah Bowen    MRN: [id number]           Patient Information     Date Of Birth          1995        Visit Information        Provider Department      1/5/2018 9:20 AM Rod Carey, [facility]        Today's Diagnoses     Myopia of both eyes    -  1       Follow-ups after your visit        Follow-up notes from your care team     Return in about 1 year (around 1/5/2019) for Comprehensive Exam.      Your next 10 appointments already scheduled     Mar 12, 2018  2:55 PM CDT   (Arrive by 2:40 PM)   Return Allergy with Sahil Sarmiento MD   [facility] ([facility])    [address] [place] [address]  [address] [place] [id number] [phone]           Do not take anti-histamines or Zantac for seven day prior to your appointment.              Who to contact     Please call your clinic at [phone] to:    Ask questions about your health    Make or cancel appointments    Discuss your medicines    Learn about your test results    Speak to your doctor   If you have compliments or concerns about an experience at your clinic, or if you wish to file a complaint, please contact [facility] Patient Relations at [phone] or email us at [email or web address]         Additional Information About Your Visit        MyChart Information     [employer] gives you secure access to your electronic health record. If you see a primary care provider, you can also send messages to your care team and make appointments. If you have questions, please call your primary care clinic.  If you do not have a primary care provider, please call [phone] and they will assist you.      [employer] is an electronic gateway that provides easy, online access to your medical records. With [employer], you can request a clinic appointment, read your test results, renew a prescription or  communicate with your care team.     To access your existing account, please contact your [facility] or call [phone] for assistance.        Care EveryWhere ID     This is your Care EveryWhere ID. This could be used by other organizations to access your [facility] medical records  [id number]        Your Vitals Were     Last Period                   12/30/2017            Blood Pressure from Last 3 Encounters:   01/02/18 100/60   12/15/17 129/67   11/27/17 119/78    Weight from Last 3 Encounters:   01/02/18 82.1 kg (181 lb)   12/15/17 82.6 kg (182 lb)   11/27/17 82.1 kg (181 lb)              Today, you had the following     No orders found for display       Primary Care Provider Office Phone # Fax #    Cecelia Gonzalez [phone]       [address]  [place] [address]        Equal Access to Services     [facility]: Hadii aad ku hadasho Soomaali, waaxda luqadaha, qaybta kaalmada adeegyada, waxay idiin hayaan rolando kharadarren la'gilbertn . So [facility] [phone].    ATENCIÓN: Si habla español, tiene a magaña disposición servicios gratuitos de asistencia lingüística. Llame al [phone].    We comply with applicable federal civil rights laws and [place] laws. We do not discriminate on the basis of race, color, national origin, age, disability, sex, sexual orientation, or gender identity.            Thank you!     Thank you for choosing [facility]  for your care. Our goal is always to provide you with excellent care. Hearing back from our patients is one way we can continue to improve our services. Please take a few minutes to complete the written survey that you may receive in the mail after your visit with us. Thank you!             Your Updated Medication List - Protect others around you: Learn how to safely use, store and throw away your medicines at www.disposemymeds.org.          This list is accurate as of: 1/5/18 10:28 AM.  Always use your  most recent med list.                   Brand Name Dispense Instructions for use Diagnosis    albuterol 108 (90 BASE) MCG/ACT Inhaler    PROAIR HFA/PROVENTIL HFA/VENTOLIN HFA    1 Inhaler    Inhale 2 puffs into the lungs every 6 hours as needed for shortness of breath / dyspnea or wheezing    Acute bronchospasm       citalopram 10 MG tablet    celeXA    90 tablet    Take 1 tablet (10 mg) by mouth daily    TJ (generalized anxiety disorder)       drospirenone-ethinyl estradiol 3-0.02 MG per tablet    PRATIMA    84 tablet    Take 1 tablet by mouth daily    Acne vulgaris       EPINEPHrine 0.3 MG/0.3ML injection 2-pack    EPIPEN/ADRENACLICK/or ANY BX GENERIC EQUIV    2 each    Inject 0.3 mLs (0.3 mg) into the muscle once as needed for anaphylaxis    Dermatitis due to food taken internally       hydrOXYzine 25 MG tablet    ATARAX    60 tablet    Take 1-2 tablets (25-50 mg) by mouth every 6 hours as needed for anxiety    TJ (generalized anxiety disorder)       metroNIDAZOLE 0.75 % cream    METROCREAM    45 g    Apply once daily to forehead every morning    Acne vulgaris       MULTIVITAMIN & MINERAL PO           propranolol 10 MG tablet    INDERAL    90 tablet    Take 1 tablet (10 mg) by mouth 3 times daily as needed    Need for prophylactic vaccination and inoculation against influenza       sulfacetamide sodium-sulfur 10-5 % Lotn     60 g    Apply once daily to forehead.    Dermatitis, seborrheic       * tretinoin 0.05 % cream    RETIN-A    45 g    Apply topically At Bedtime To back as directed    Acne vulgaris       * tretinoin 0.025 % cream    RETIN-A    45 g    Use every night on face as directed    Acne vulgaris       * Notice:  This list has 2 medication(s) that are the same as other medications prescribed for you. Read the directions carefully, and ask your doctor or other care provider to review them with you.

## 2018-05-14 ENCOUNTER — OFFICE VISIT (OUTPATIENT)
Dept: PULMONOLOGY | Facility: CLINIC | Age: 23
End: 2018-05-14
Attending: ALLERGY & IMMUNOLOGY
Payer: COMMERCIAL

## 2018-05-14 VITALS
WEIGHT: 175 LBS | OXYGEN SATURATION: 95 % | HEART RATE: 87 BPM | SYSTOLIC BLOOD PRESSURE: 107 MMHG | BODY MASS INDEX: 24.5 KG/M2 | RESPIRATION RATE: 18 BRPM | DIASTOLIC BLOOD PRESSURE: 72 MMHG | HEIGHT: 71 IN

## 2018-05-14 DIAGNOSIS — Z88.1 DRUG ALLERGY, ANTIBIOTIC: Primary | ICD-10-CM

## 2018-05-14 DIAGNOSIS — L50.8 URTICARIA, CHRONIC: ICD-10-CM

## 2018-05-14 PROCEDURE — G0463 HOSPITAL OUTPT CLINIC VISIT: HCPCS | Mod: ZF

## 2018-05-14 RX ORDER — DOXYCYCLINE 25 MG/5ML
100 POWDER, FOR SUSPENSION ORAL ONCE
Qty: 20 ML | Refills: 0 | Status: SHIPPED | OUTPATIENT
Start: 2018-05-14 | End: 2018-05-14

## 2018-05-14 RX ORDER — DOXYCYCLINE HYCLATE 50 MG/1
100 CAPSULE ORAL 2 TIMES DAILY
Status: CANCELLED | OUTPATIENT
Start: 2018-05-14

## 2018-05-14 RX ORDER — DOXYCYCLINE 100 MG/1
100 CAPSULE ORAL ONCE
Qty: 1 CAPSULE | Refills: 0 | Status: SHIPPED | OUTPATIENT
Start: 2018-05-14 | End: 2018-05-14

## 2018-05-14 ASSESSMENT — PAIN SCALES - GENERAL: PAINLEVEL: NO PAIN (0)

## 2018-05-14 NOTE — LETTER
5/14/2018       RE: Farrah Bowen  [address]  [facility] [address]     Dear Colleague,    Thank you for referring your patient, Farrah Bowen, to the [facility] at [facility]. Please see a copy of my visit note below.    Reason for Visit  Farrah Bowen is a 22 year old female who is referred by Cecelia Gonzalez for hives    Allergy HPI  2nd day of doxycyline for pneumoccal pneumonia and hives all over and swelling of the hands and nausea.  Lasted 24 hours and took a lot of zyrtec.  Changed to zithromax and lasted 48 hours.  Hives were raised itchy and up to a half inch. Had codeine cough syrup the night before but only took that one dose.     She does get hives with stress.       She has possibly reacted to escargot before.  Does eat shrimp and finned fish.    Amoxicilllin caused erythema multiform.    Family hx:  No hx of hives, mom with amoxicillin allergy    The patient was seen and examined by Sahil Rubio MD   Current Outpatient Prescriptions   Medication     albuterol (PROAIR HFA/PROVENTIL HFA/VENTOLIN HFA) 108 (90 BASE) MCG/ACT Inhaler     citalopram (CELEXA) 10 MG tablet     drospirenone-ethinyl estradiol (PRATIMA) 3-0.02 MG per tablet     EPINEPHrine (EPIPEN) 0.3 MG/0.3ML injection     hydrOXYzine (ATARAX) 25 MG tablet     metroNIDAZOLE (METROCREAM) 0.75 % cream     Multiple Vitamins-Minerals (MULTIVITAMIN & MINERAL PO)     propranolol (INDERAL) 10 MG tablet     sulfacetamide sodium-sulfur 10-5 % LOTN     tretinoin (RETIN-A) 0.025 % cream     tretinoin (RETIN-A) 0.05 % cream     No current facility-administered medications for this visit.      Allergies   Allergen Reactions     Amoxicillin Rash     Severe rash     Doxycycline Hives     Seafood Hives     Swollen Lips       Social History     Social History     Marital status: Single     Spouse name: single     Number of children: 0     Years of education: N/A  "    Occupational History     student      Social History Main Topics     Smoking status: Never Smoker     Smokeless tobacco: Never Used     Alcohol use 0.0 oz/week     0 Standard drinks or equivalent per week      Comment: 3/ month     Drug use: No     Sexual activity: Yes     Partners: Male     Birth control/ protection: Condom, Pill     Other Topics Concern     Not on file     Social History Narrative     Past Medical History:   Diagnosis Date     Achilles tendonitis      Acne      TJ (generalized anxiety disorder) 10/6/2017     Hearing loss in right ear      Pneumonia of right upper lobe due to infectious organism (H) 11/27/2017     Recurrent otitis media     as a child, still sometimes has ear porblems especially on places     No past surgical history on file.  Family History   Problem Relation Age of Onset     Unknown/Adopted Father      Obesity Mother      Eye Disorder Paternal Grandmother      glaucoma     Breast Cancer Paternal Grandmother      Hypertension Paternal Grandmother      Glaucoma Paternal Grandmother      Anxiety Disorder Paternal Grandmother      CANCER Paternal Grandmother      Other Cancer Paternal Grandmother      Alzheimer Disease Other      Multiple Family Members     Hypertension Paternal Grandfather      CEREBROVASCULAR DISEASE Paternal Grandfather      Cardiac Sudden Death Paternal Grandfather      DIABETES No family hx of      Thyroid Disease No family hx of      Macular Degeneration No family hx of          ROS   A complete ROS was otherwise negative except as noted in the HPI and the end of the note.  /72  Pulse 87  Resp 18  Ht 1.803 m (5' 11\")  Wt 79.4 kg (175 lb)  SpO2 95%  BMI 24.41 kg/m2  Exam:   GENERAL APPEARANCE: Well developed, well nourished, alert, and in no apparent distress.  EYES: PERRL, EOMI, conjunctiva clear non-injected  HENT: Nasal mucosa with no edema and no discharge. No nasal polyps.  No facial tenderness.  MOUTH: Oral mucosa is moist, without any " lesions, no tonsillar enlargement, no oropharyngeal exudate.  NECK: No nasal dc.  LYMPHATICS: No significant cervical, or supraclavicular nodes.  RESP: Good air flow throughout.  No crackles. No rhonchi. No wheezes.  CV: Normal S1, S2, regular rhythm, normal rate. No murmur.  No rub. No gallop. No LE edema.   MS: Extremities normal. No clubbing. No cyanosis.  SKIN: No rashes noted  NEURO: Speech normal, normal strength and tone, normal gait and stance  PSYCH: Normal mentation, orientation to person, place, and time.  Results:      Assessment and plan:   Codeine the night before but 2 days of doxycycline.  She does get hives with stress and sometimes with heat after a quick change in temperature.  Cholinergic urticaria is most likely the temperature related reactions. She can take hydroxyzine prn that she already has for anxiety.    The fact that she gets frequent hives Codeine is most likely the trigger.  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  We gave the option to list both as allergens or do an ingestion challenge to doxy and she wanted to proceed with the ingestion challenge.  We had trouble getting the dose in a timely fashion today and she needs to be here for about 2 hours so she will reschedule when she returns from [facility].  She should avoid all opiods.            Again, thank you for allowing me to participate in the care of your patient.      Sincerely,    Sahil Rubio MD

## 2018-05-14 NOTE — PROGRESS NOTES
Reason for Visit  Farrah Bowen is a 22 year old female who is referred by CarlosCecelia for hives    Allergy HPI  2nd day of doxycyline for pneumoccal pneumonia and hives all over and swelling of the hands and nausea.  Lasted 24 hours and took a lot of zyrtec.  Changed to zithromax and lasted 48 hours.  Hives were raised itchy and up to a half inch. Had codeine cough syrup the night before but only took that one dose.     She does get hives with stress.       She has possibly reacted to escargot before.  Does eat shrimp and finned fish.    Amoxicilllin caused erythema multiform.    Family hx:  No hx of hives, mom with amoxicillin allergy    The patient was seen and examined by Sahil Rubio MD   Current Outpatient Prescriptions   Medication     albuterol (PROAIR HFA/PROVENTIL HFA/VENTOLIN HFA) 108 (90 BASE) MCG/ACT Inhaler     citalopram (CELEXA) 10 MG tablet     drospirenone-ethinyl estradiol (PRATIMA) 3-0.02 MG per tablet     EPINEPHrine (EPIPEN) 0.3 MG/0.3ML injection     hydrOXYzine (ATARAX) 25 MG tablet     metroNIDAZOLE (METROCREAM) 0.75 % cream     Multiple Vitamins-Minerals (MULTIVITAMIN & MINERAL PO)     propranolol (INDERAL) 10 MG tablet     sulfacetamide sodium-sulfur 10-5 % LOTN     tretinoin (RETIN-A) 0.025 % cream     tretinoin (RETIN-A) 0.05 % cream     No current facility-administered medications for this visit.      Allergies   Allergen Reactions     Amoxicillin Rash     Severe rash     Doxycycline Hives     Seafood Hives     Swollen Lips       Social History     Social History     Marital status: Single     Spouse name: single     Number of children: 0     Years of education: N/A     Occupational History     student      Social History Main Topics     Smoking status: Never Smoker     Smokeless tobacco: Never Used     Alcohol use 0.0 oz/week     0 Standard drinks or equivalent per week      Comment: 3/ month     Drug use: No     Sexual activity: Yes     Partners: Male     Birth control/  "protection: Condom, Pill     Other Topics Concern     Not on file     Social History Narrative     Past Medical History:   Diagnosis Date     Achilles tendonitis      Acne      TJ (generalized anxiety disorder) 10/6/2017     Hearing loss in right ear      Pneumonia of right upper lobe due to infectious organism (H) 11/27/2017     Recurrent otitis media     as a child, still sometimes has ear porblems especially on places     No past surgical history on file.  Family History   Problem Relation Age of Onset     Unknown/Adopted Father      Obesity Mother      Eye Disorder Paternal Grandmother      glaucoma     Breast Cancer Paternal Grandmother      Hypertension Paternal Grandmother      Glaucoma Paternal Grandmother      Anxiety Disorder Paternal Grandmother      CANCER Paternal Grandmother      Other Cancer Paternal Grandmother      Alzheimer Disease Other      Multiple Family Members     Hypertension Paternal Grandfather      CEREBROVASCULAR DISEASE Paternal Grandfather      Cardiac Sudden Death Paternal Grandfather      DIABETES No family hx of      Thyroid Disease No family hx of      Macular Degeneration No family hx of          ROS   A complete ROS was otherwise negative except as noted in the HPI and the end of the note.  /72  Pulse 87  Resp 18  Ht 1.803 m (5' 11\")  Wt 79.4 kg (175 lb)  SpO2 95%  BMI 24.41 kg/m2  Exam:   GENERAL APPEARANCE: Well developed, well nourished, alert, and in no apparent distress.  EYES: PERRL, EOMI, conjunctiva clear non-injected  HENT: Nasal mucosa with no edema and no discharge. No nasal polyps.  No facial tenderness.  MOUTH: Oral mucosa is moist, without any lesions, no tonsillar enlargement, no oropharyngeal exudate.  NECK: No nasal dc.  LYMPHATICS: No significant cervical, or supraclavicular nodes.  RESP: Good air flow throughout.  No crackles. No rhonchi. No wheezes.  CV: Normal S1, S2, regular rhythm, normal rate. No murmur.  No rub. No gallop. No LE edema.   MS: " Extremities normal. No clubbing. No cyanosis.  SKIN: No rashes noted  NEURO: Speech normal, normal strength and tone, normal gait and stance  PSYCH: Normal mentation, orientation to person, place, and time.  Results:      Assessment and plan:   Codeine the night before but 2 days of doxycycline.  She does get hives with stress and sometimes with heat after a quick change in temperature.  Cholinergic urticaria is most likely the temperature related reactions. She can take hydroxyzine prn that she already has for anxiety.    The fact that she gets frequent hives Codeine is most likely the trigger.  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  We gave the option to list both as allergens or do an ingestion challenge to doxy and she wanted to proceed with the ingestion challenge.  We had trouble getting the dose in a timely fashion today and she needs to be here for about 2 hours so she will reschedule when she returns from [facility].  She should avoid all opiods.

## 2018-05-14 NOTE — MR AVS SNAPSHOT
After Visit Summary   5/14/2018    Farrah Bowen    MRN: [id number]           Patient Information     Date Of Birth          1995        Visit Information        Provider Department      5/14/2018 12:15 PM Sahil Sarmiento MD [facility]        Today's Diagnoses     Drug allergy, antibiotic    -  1    Urticaria, chronic           Follow-ups after your visit        Your next 10 appointments already scheduled     May 15, 2018  8:00 AM CDT   PHYSICAL with Cecelia Gonzalez MD   [facility]    [address] [place] [id number]   [phone]            Aug 13, 2018 12:55 PM CDT   (Arrive by 12:40 PM)   Return Allergy with Sahil Sarmiento MD   [facility] ([facility] [place])    [address]  [address] [place] [address] [phone]           Do not take anti-histamines or Zantac for seven day prior to your appointment.              Who to contact     If you have questions or need follow up information about today's clinic visit or your schedule please contact [facility] directly at [phone].  Normal or non-critical lab and imaging results will be communicated to you by [employer], letter or phone within 4 business days after the clinic has received the results. If you do not hear from us within 7 days, please contact the clinic through [employer] or phone. If you have a critical or abnormal lab result, we will notify you by phone as soon as possible.  Submit refill requests through [employer] or call your pharmacy and they will forward the refill request to us. Please allow 3 business days for your refill to be completed.          Additional Information About Your Visit        [employer] Information     [employer] gives you secure access to your electronic health record. If you see a primary care  "provider, you can also send messages to your care team and make appointments. If you have questions, please call your primary care clinic.  If you do not have a primary care provider, please call [phone] and they will assist you.        Care EveryWhere ID     This is your Care EveryWhere ID. This could be used by other organizations to access your [facility] medical records  [id number]        Your Vitals Were     Pulse Respirations Height Pulse Oximetry BMI (Body Mass Index)       87 18 1.803 m (5' 11\") 95% 24.41 kg/m2        Blood Pressure from Last 3 Encounters:   05/14/18 107/72   01/02/18 100/60   12/15/17 129/67    Weight from Last 3 Encounters:   05/14/18 79.4 kg (175 lb)   01/02/18 82.1 kg (181 lb)   12/15/17 82.6 kg (182 lb)              Today, you had the following     No orders found for display         Today's Medication Changes          These changes are accurate as of 5/14/18  1:35 PM.  If you have any questions, ask your nurse or doctor.               Start taking these medicines.        Dose/Directions    * doxycycline monohydrate 25 MG/5ML Susr   Commonly known as:  VIBRAMYCIN   Used for:  Drug allergy, antibiotic   Started by:  Sahil Sarmiento MD        Dose:  100 mg   Take 20 mLs (100 mg) by mouth once for 1 dose   Quantity:  20 mL   Refills:  0       * doxycycline monohydrate 100 MG capsule   Used for:  Drug allergy, antibiotic   Started by:  Sahil Sarmiento MD        Dose:  100 mg   Take 1 capsule (100 mg) by mouth once for 1 dose   Quantity:  1 capsule   Refills:  0       * Notice:  This list has 2 medication(s) that are the same as other medications prescribed for you. Read the directions carefully, and ask your doctor or other care provider to review them with you.         Where to get your medicines      These medications were sent to [facility] [place], [place] - [address]  [address] [facility] [address] [address]    " Hours:  TRANSPLANT PHONE NUMBER [phone] Phone:  [phone]     doxycycline monohydrate 100 MG capsule         Some of these will need a paper prescription and others can be bought over the counter.  Ask your nurse if you have questions.     Bring a paper prescription for each of these medications     doxycycline monohydrate 25 MG/5ML Susr                Primary Care Provider Office Phone # Fax #    Cecelia Gonzalez [phone]       [address]  [place] [address]        Equal Access to Services     [facility]: Hadii aad ku hadasho Soomaali, waaxda luqadaha, qaybta kaalmada adeegyada, waxay idiin hayaan adeeg khirwin morrow . So [facility] [phone].    ATENCIÓN: Si habla español, tiene a magaña disposición servicios gratuitos de asistencia lingüística. Ll[facility] [phone].    We comply with applicable federal civil rights laws and [place] laws. We do not discriminate on the basis of race, color, national origin, age, disability, sex, sexual orientation, or gender identity.            Thank you!     Thank you for choosing [facility]  for your care. Our goal is always to provide you with excellent care. Hearing back from our patients is one way we can continue to improve our services. Please take a few minutes to complete the written survey that you may receive in the mail after your visit with us. Thank you!             Your Updated Medication List - Protect others around you: Learn how to safely use, store and throw away your medicines at www.disposemymeds.org.          This list is accurate as of 5/14/18  1:35 PM.  Always use your most recent med list.                   Brand Name Dispense Instructions for use Diagnosis    albuterol 108 (90 Base) MCG/ACT Inhaler    PROAIR HFA/PROVENTIL HFA/VENTOLIN HFA    1 Inhaler    Inhale 2 puffs into the lungs every 6 hours as needed for shortness of breath / dyspnea or wheezing    Acute bronchospasm        citalopram 10 MG tablet    celeXA    30 tablet    Take 1 tablet (10 mg) by mouth daily    TJ (generalized anxiety disorder)       * doxycycline monohydrate 25 MG/5ML Susr    VIBRAMYCIN    20 mL    Take 20 mLs (100 mg) by mouth once for 1 dose    Drug allergy, antibiotic       * doxycycline monohydrate 100 MG capsule     1 capsule    Take 1 capsule (100 mg) by mouth once for 1 dose    Drug allergy, antibiotic       drospirenone-ethinyl estradiol 3-0.02 MG per tablet    PRATIMA    84 tablet    Take 1 tablet by mouth daily    Acne vulgaris       EPINEPHrine 0.3 MG/0.3ML injection 2-pack    EPIPEN/ADRENACLICK/or ANY BX GENERIC EQUIV    2 each    Inject 0.3 mLs (0.3 mg) into the muscle once as needed for anaphylaxis    Dermatitis due to food taken internally       hydrOXYzine 25 MG tablet    ATARAX    60 tablet    Take 1-2 tablets (25-50 mg) by mouth every 6 hours as needed for anxiety    TJ (generalized anxiety disorder)       metroNIDAZOLE 0.75 % cream    METROCREAM    45 g    Apply once daily to forehead every morning    Acne vulgaris       MULTIVITAMIN & MINERAL PO           propranolol 10 MG tablet    INDERAL    90 tablet    Take 1 tablet (10 mg) by mouth 3 times daily as needed    Need for prophylactic vaccination and inoculation against influenza       sulfacetamide sodium-sulfur 10-5 % Lotn     60 g    Apply once daily to forehead.    Dermatitis, seborrheic       * tretinoin 0.05 % cream    RETIN-A    45 g    Apply topically At Bedtime To back as directed    Acne vulgaris       * tretinoin 0.025 % cream    RETIN-A    45 g    Use every night on face as directed    Acne vulgaris       * Notice:  This list has 4 medication(s) that are the same as other medications prescribed for you. Read the directions carefully, and ask your doctor or other care provider to review them with you.

## 2018-05-15 ENCOUNTER — OFFICE VISIT (OUTPATIENT)
Dept: FAMILY MEDICINE | Facility: CLINIC | Age: 23
End: 2018-05-15
Payer: COMMERCIAL

## 2018-05-15 VITALS
HEART RATE: 79 BPM | SYSTOLIC BLOOD PRESSURE: 112 MMHG | WEIGHT: 179 LBS | TEMPERATURE: 98.3 F | BODY MASS INDEX: 25.62 KG/M2 | DIASTOLIC BLOOD PRESSURE: 69 MMHG | HEIGHT: 70 IN | OXYGEN SATURATION: 97 %

## 2018-05-15 DIAGNOSIS — F41.1 GAD (GENERALIZED ANXIETY DISORDER): Primary | ICD-10-CM

## 2018-05-15 DIAGNOSIS — Z11.3 SCREENING EXAMINATION FOR VENEREAL DISEASE: ICD-10-CM

## 2018-05-15 DIAGNOSIS — L70.0 ACNE VULGARIS: ICD-10-CM

## 2018-05-15 PROCEDURE — 87491 CHLMYD TRACH DNA AMP PROBE: CPT | Performed by: INTERNAL MEDICINE

## 2018-05-15 PROCEDURE — 87591 N.GONORRHOEAE DNA AMP PROB: CPT | Performed by: INTERNAL MEDICINE

## 2018-05-15 PROCEDURE — 99214 OFFICE O/P EST MOD 30 MIN: CPT | Performed by: INTERNAL MEDICINE

## 2018-05-15 RX ORDER — HYDROXYZINE HYDROCHLORIDE 25 MG/1
25-50 TABLET, FILM COATED ORAL EVERY 6 HOURS PRN
Qty: 90 TABLET | Refills: 1 | Status: SHIPPED | OUTPATIENT
Start: 2018-05-15 | End: 2020-12-03

## 2018-05-15 RX ORDER — CITALOPRAM HYDROBROMIDE 10 MG/1
10 TABLET ORAL DAILY
Qty: 90 TABLET | Refills: 1 | Status: SHIPPED | OUTPATIENT
Start: 2018-05-15 | End: 2018-08-14

## 2018-05-15 RX ORDER — DROSPIRENONE AND ETHINYL ESTRADIOL 0.02-3(28)
1 KIT ORAL DAILY
Qty: 84 TABLET | Refills: 3 | Status: SHIPPED | OUTPATIENT
Start: 2018-05-15 | End: 2018-08-14

## 2018-05-15 RX ORDER — PROPRANOLOL HYDROCHLORIDE 10 MG/1
10 TABLET ORAL 3 TIMES DAILY PRN
Qty: 90 TABLET | Refills: 3 | Status: SHIPPED | OUTPATIENT
Start: 2018-05-15 | End: 2018-08-14

## 2018-05-15 NOTE — PROGRESS NOTES
SUBJECTIVE:   Farrah Bowen is a 22 year old female who presents to clinic today for the following health issues:      Medication Followup of  Celexa, propranolol     Taking Medication as prescribed: yes    Side Effects:  None    Medication Helping Symptoms:  yes       Farrah is a 21 y/o female with generalized anxiety disorder who is here for medication follow up. She is taking Citalopram 5 mg daily and feels that this has helped her significantly. She was previously taking the propranolol daily and now only needs to take it before tests. She says she is sleeping well. She has hydroxyzine to help her with sleep.     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 She will be staying with her cousin.       Problem list and histories reviewed & adjusted, as indicated.  Additional history: as documented    Patient Active Problem List   Diagnosis     Acne vulgaris     Achilles tendonitis     Hearing loss in right ear     TJ (generalized anxiety disorder)     No past surgical history on file.    Social History   Substance Use Topics     Smoking status: Never Smoker     Smokeless tobacco: Never Used     Alcohol use 0.0 oz/week     0 Standard drinks or equivalent per week      Comment: 3/ month     Family History   Problem Relation Age of Onset     Unknown/Adopted Father      Obesity Mother      Eye Disorder Paternal Grandmother      glaucoma     Breast Cancer Paternal Grandmother      Hypertension Paternal Grandmother      Glaucoma Paternal Grandmother      Anxiety Disorder Paternal Grandmother      CANCER Paternal Grandmother      Other Cancer Paternal Grandmother      Alzheimer Disease Other      Multiple Family Members     Hypertension Paternal Grandfather      CEREBROVASCULAR DISEASE Paternal Grandfather      Cardiac Sudden Death Paternal Grandfather      DIABETES No family hx of      Thyroid Disease No family hx of      Macular Degeneration No family hx of       "      Reviewed and updated as needed this visit by clinical staff       Reviewed and updated as needed this visit by Provider         ROS:  Constitutional, HEENT, cardiovascular, pulmonary, gi and gu systems are negative, except as otherwise noted.    OBJECTIVE:     /69 (BP Location: Left arm, Cuff Size: Adult Regular)  Pulse 79  Temp 98.3  F (36.8  C) (Oral)  Ht 5' 10.47\" (1.79 m)  Wt 179 lb (81.2 kg)  LMP 04/24/2018  SpO2 97%  BMI 25.34 kg/m2  Body mass index is 25.34 kg/(m^2).  GENERAL: healthy, alert and no distress  RESP: lungs clear to auscultation - no rales, rhonchi or wheezes  CV: regular rate and rhythm, normal S1 S2, no S3 or S4, no murmur, click or rub, no peripheral edema and peripheral pulses strong  PSYCH: mentation appears normal, affect normal/bright    Diagnostic Test Results:  none     ASSESSMENT/PLAN:       1. TJ (generalized anxiety disorder)  Doing very well on 10 mg Citalopram. Will renew today, along with refills on propranolol and hydrozyxine. Farrah will f/u in 6 months.  - hydrOXYzine (ATARAX) 25 MG tablet; Take 1-2 tablets (25-50 mg) by mouth every 6 hours as needed for anxiety  Dispense: 90 tablet; Refill: 1  - citalopram (CELEXA) 10 MG tablet; Take 1 tablet (10 mg) by mouth daily  Dispense: 90 tablet; Refill: 1    2. Screening examination for venereal disease  Due for pap smear. She will do this at her next visit with me.   - Chlamydia trachomatis PCR  - Neisseria gonorrhoeae PCR    3. Acne vulgaris  Refilling OCP.  - drospirenone-ethinyl estradiol (PRATIMA) 3-0.02 MG per tablet; Take 1 tablet by mouth daily  Dispense: 84 tablet; Refill: 3    Follow up in 6 month(s) or sooner if needed      Cecelia Gonzalez MD  [facility]    "

## 2018-05-15 NOTE — MR AVS SNAPSHOT
After Visit Summary   5/15/2018    Farrah Bowen    MRN: [id number]           Patient Information     Date Of Birth          1995        Visit Information        Provider Department      5/15/2018 8:00 AM Cecelia [facility] MD [facility]        Today's Diagnoses     TJ (generalized anxiety disorder)    -  1    Screening examination for venereal disease        Acne vulgaris           Follow-ups after your visit        Follow-up notes from your care team     Return in about 6 months (around 11/15/2018) for Medication Follow up.      Your next 10 appointments already scheduled     Aug 13, 2018 12:55 PM CDT   (Arrive by 12:40 PM)   Return Allergy with Sahil Sarmiento MD   [facility] ([facility])    [address] [place] [address]  [address] [place] [address] [phone]           Do not take anti-histamines or Zantac for seven day prior to your appointment.              Who to contact     If you have questions or need follow up information about today's clinic visit or your schedule please contact [facility] [place] directly at [phone].  Normal or non-critical lab and imaging results will be communicated to you by MyChart, letter or phone within 4 business days after the clinic has received the results. If you do not hear from us within 7 days, please contact the clinic through MyChart or phone. If you have a critical or abnormal lab result, we will notify you by phone as soon as possible.  Submit refill requests through [employer] or call your pharmacy and they will forward the refill request to us. Please allow 3 business days for your refill to be completed.          Additional Information About Your Visit        MyChart Information     [employer] gives you secure access to your electronic health record. If you see a primary care provider, you can also send messages to your care team and make  "appointments. If you have questions, please call your primary care clinic.  If you do not have a primary care provider, please call [phone] and they will assist you.        Care EveryWhere ID     This is your Care EveryWhere ID. This could be used by other organizations to access your [facility] medical records  [id number]        Your Vitals Were     Pulse Temperature Height Last Period Pulse Oximetry BMI (Body Mass Index)    79 98.3  F (36.8  C) (Oral) 5' 10.47\" (1.79 m) 04/24/2018 97% 25.34 kg/m2       Blood Pressure from Last 3 Encounters:   05/15/18 112/69   05/14/18 107/72   01/02/18 100/60    Weight from Last 3 Encounters:   05/15/18 179 lb (81.2 kg)   05/14/18 175 lb (79.4 kg)   01/02/18 181 lb (82.1 kg)              We Performed the Following     Chlamydia trachomatis PCR     Neisseria gonorrhoeae PCR          Where to get your medicines      These medications were sent to [facility]/pharmacy #[id number] - [place], [place] - [address]  [address] [address]     Phone:  [phone]     citalopram 10 MG tablet    drospirenone-ethinyl estradiol 3-0.02 MG per tablet    hydrOXYzine 25 MG tablet    propranolol 10 MG tablet          Primary Care Provider Office Phone # Fax #    Cecelia Gonzalez [phone]       [address]  [place] [address]        Equal Access to Services     [facility]: Hadii aad ku hadasho Soomaali, waaxda luqadaha, qaybta kaalmada melissa, rafael morrow . So [facility] [phone].    ATENCIÓN: Si rhettla miguelangel, tiene a magaña disposición servicios gratuitos de asistencia lingüística. Llame al [phone].    We comply with applicable federal civil rights laws and [place] laws. We do not discriminate on the basis of race, color, national origin, age, disability, sex, sexual orientation, or gender identity.            Thank you!     Thank you for choosing [facility] [place]  for your care. Our goal is always to provide you " with excellent care. Hearing back from our patients is one way we can continue to improve our services. Please take a few minutes to complete the written survey that you may receive in the mail after your visit with us. Thank you!             Your Updated Medication List - Protect others around you: Learn how to safely use, store and throw away your medicines at www.disposemymeds.org.          This list is accurate as of 5/15/18  8:20 AM.  Always use your most recent med list.                   Brand Name Dispense Instructions for use Diagnosis    albuterol 108 (90 Base) MCG/ACT Inhaler    PROAIR HFA/PROVENTIL HFA/VENTOLIN HFA    1 Inhaler    Inhale 2 puffs into the lungs every 6 hours as needed for shortness of breath / dyspnea or wheezing    Acute bronchospasm       citalopram 10 MG tablet    celeXA    90 tablet    Take 1 tablet (10 mg) by mouth daily    TJ (generalized anxiety disorder)       drospirenone-ethinyl estradiol 3-0.02 MG per tablet    PRATIMA    84 tablet    Take 1 tablet by mouth daily    Acne vulgaris       EPINEPHrine 0.3 MG/0.3ML injection 2-pack    EPIPEN/ADRENACLICK/or ANY BX GENERIC EQUIV    2 each    Inject 0.3 mLs (0.3 mg) into the muscle once as needed for anaphylaxis    Dermatitis due to food taken internally       hydrOXYzine 25 MG tablet    ATARAX    90 tablet    Take 1-2 tablets (25-50 mg) by mouth every 6 hours as needed for anxiety    TJ (generalized anxiety disorder)       metroNIDAZOLE 0.75 % cream    METROCREAM    45 g    Apply once daily to forehead every morning    Acne vulgaris       MULTIVITAMIN & MINERAL PO           propranolol 10 MG tablet    INDERAL    90 tablet    Take 1 tablet (10 mg) by mouth 3 times daily as needed        sulfacetamide sodium-sulfur 10-5 % Lotn     60 g    Apply once daily to forehead.    Dermatitis, seborrheic       * tretinoin 0.05 % cream    RETIN-A    45 g    Apply topically At Bedtime To back as directed    Acne vulgaris       * tretinoin 0.025 %  cream    RETIN-A    45 g    Use every night on face as directed    Acne vulgaris       * Notice:  This list has 2 medication(s) that are the same as other medications prescribed for you. Read the directions carefully, and ask your doctor or other care provider to review them with you.

## 2018-05-16 LAB
C TRACH DNA SPEC QL NAA+PROBE: NEGATIVE
N GONORRHOEA DNA SPEC QL NAA+PROBE: NEGATIVE
SPECIMEN SOURCE: NORMAL
SPECIMEN SOURCE: NORMAL

## 2018-08-13 ENCOUNTER — OFFICE VISIT (OUTPATIENT)
Dept: PULMONOLOGY | Facility: CLINIC | Age: 23
End: 2018-08-13
Attending: ALLERGY & IMMUNOLOGY
Payer: COMMERCIAL

## 2018-08-13 VITALS
HEART RATE: 74 BPM | DIASTOLIC BLOOD PRESSURE: 53 MMHG | SYSTOLIC BLOOD PRESSURE: 94 MMHG | OXYGEN SATURATION: 99 % | RESPIRATION RATE: 16 BRPM

## 2018-08-13 DIAGNOSIS — Z88.1 DRUG ALLERGY, ANTIBIOTIC: Primary | ICD-10-CM

## 2018-08-13 PROCEDURE — 95076 INGEST CHALLENGE INI 120 MIN: CPT | Mod: ZF | Performed by: ALLERGY & IMMUNOLOGY

## 2018-08-13 PROCEDURE — G0463 HOSPITAL OUTPT CLINIC VISIT: HCPCS | Mod: 25,ZF

## 2018-08-13 ASSESSMENT — PAIN SCALES - GENERAL: PAINLEVEL: NO PAIN (0)

## 2018-08-13 NOTE — PROGRESS NOTES
Reason for Visit  Farrah Bowen is a 22 year old female who is referred by Carlos, Cecelia Hastings for hives due to unclear source.    Allergy HPI  Farrah returns today to do her oral doxycycline challenge. She had a panick attack a few weeks ago and developed hives and she did take hydroxyzine and none since.      The patient was seen and examined by Winnie Angelo MD   Current Outpatient Prescriptions   Medication     albuterol (PROAIR HFA/PROVENTIL HFA/VENTOLIN HFA) 108 (90 BASE) MCG/ACT Inhaler     citalopram (CELEXA) 10 MG tablet     drospirenone-ethinyl estradiol (PRATIMA) 3-0.02 MG per tablet     hydrOXYzine (ATARAX) 25 MG tablet     metroNIDAZOLE (METROCREAM) 0.75 % cream     Multiple Vitamins-Minerals (MULTIVITAMIN & MINERAL PO)     propranolol (INDERAL) 10 MG tablet     sulfacetamide sodium-sulfur 10-5 % LOTN     tretinoin (RETIN-A) 0.025 % cream     tretinoin (RETIN-A) 0.05 % cream     EPINEPHrine (EPIPEN) 0.3 MG/0.3ML injection     No current facility-administered medications for this visit.      Allergies   Allergen Reactions     Amoxicillin Rash     Severe rash     Codeine Hives     Most opiods should be avoided     Doxycycline Hives     Seafood Hives     Swollen Lips       Social History     Social History     Marital status: Single     Spouse name: single     Number of children: 0     Years of education: N/A     Occupational History     student      Social History Main Topics     Smoking status: Never Smoker     Smokeless tobacco: Never Used     Alcohol use 0.0 oz/week     0 Standard drinks or equivalent per week      Comment: 3/ month     Drug use: No     Sexual activity: Yes     Partners: Male     Birth control/ protection: Condom, Pill     Other Topics Concern     Not on file     Social History Narrative     Past Medical History:   Diagnosis Date     Achilles tendonitis      Acne      TJ (generalized anxiety disorder) 10/6/2017     Hearing loss in right ear      Pneumonia of right upper lobe due to  infectious organism (H) 11/27/2017     Recurrent otitis media     as a child, still sometimes has ear porblems especially on places     No past surgical history on file.  Family History   Problem Relation Age of Onset     Unknown/Adopted Father      Obesity Mother      Eye Disorder Paternal Grandmother      glaucoma     Breast Cancer Paternal Grandmother      Hypertension Paternal Grandmother      Glaucoma Paternal Grandmother      Anxiety Disorder Paternal Grandmother      Cancer Paternal Grandmother      Other Cancer Paternal Grandmother      Alzheimer Disease Other      Multiple Family Members     Hypertension Paternal Grandfather      Cerebrovascular Disease Paternal Grandfather      Cardiac Sudden Death Paternal Grandfather      Diabetes No family hx of      Thyroid Disease No family hx of      Macular Degeneration No family hx of          ROS   A complete ROS was otherwise negative except as noted in the HPI and the end of the note.  There were no vitals taken for this visit.  Exam:   GENERAL APPEARANCE: Well developed, well nourished, alert, and in no apparent distress.  EYES: PERRL, EOMI, conjunctiva clear non-injected  HENT: Nasal mucosa with no edema and no discharge. No nasal polyps.  No facial tenderness.  EARS: Canals clear, TMs normal  MOUTH: Oral mucosa is moist, without any lesions, no tonsillar enlargement, no oropharyngeal exudate.  NECK: Supple, no masses, no thyromegaly.  LYMPHATICS: No significant cervical, or supraclavicular nodes.  RESP: Good air flow throughout.  No crackles. No rhonchi. No wheezes.  CV: Normal S1, S2, regular rhythm, normal rate. No murmur.  No rub. No gallop. No LE edema.   MS: Extremities normal. No clubbing. No cyanosis.  SKIN: No rashes noted  NEURO: Speech normal, normal strength and tone, normal gait and stance  PSYCH: Normal mentation, orientation to person, place, and time.  Results:    12:53 pm Doxycycline 25mg/5ml  0.1 ml given.   1:15 pm 25mg/5mL  2mL given.  1:34  pm 2 dime size flat red lesions on right cheek under her ear, she wanted to continue as the rest of the exam was normal so 10 ml given.  2:45 pm normal exam, discharged from clinic with return to care precautions      Assessment and plan:   Farrah completed the oral challenge of doxycycline in clinic today. She did not have a reaction to the challenge and was determined to not be allergic to doxycycline. She was discharged at 2:45 pm with a normal exam. I recommend she continue to avoid opioids. Additionally, if she develops a rash at home, she should take a picture and contact me. If she develops difficulty breathing or swelling of the lips, she should present to the ED. Farrah was understanding and in agreement with this plan.   With her reaction to escargot, she can choose to avoid this food or carry an epinephrine device. Since this a food that is typically easy to avoid, Farrah agreed that she would avoid the food rather than carry an epinephrine device.    Thank you for the opportunity to take part in the care of Farrah. She was seen and discussed with Dr. Sarmiento.    Winnie Angelo DO   [facility]     Physician Attestation   I, Sahil Sarmiento, saw this patient with the resident and agree with the resident and/or medical student's findings and plan of care as documented in the note.      I personally reviewed her ingestion challenge and decided on doses and discussed risks    Sahil Sarmiento MD  Date of Service (when I saw the patient): 8/13/18

## 2018-08-13 NOTE — MR AVS SNAPSHOT
After Visit Summary   8/13/2018    Farrah Bowen    MRN: [id number]           Patient Information     Date Of Birth          1995        Visit Information        Provider Department      8/13/2018 12:55 PM Sahil Sarmiento MD [facility]        Today's Diagnoses     Drug allergy, antibiotic    -  1       Follow-ups after your visit        Follow-up notes from your care team     Return if symptoms worsen or fail to improve.      Who to contact     If you have questions or need follow up information about today's clinic visit or your schedule please contact [facility] directly at [phone].  Normal or non-critical lab and imaging results will be communicated to you by MyChart, letter or phone within 4 business days after the clinic has received the results. If you do not hear from us within 7 days, please contact the clinic through Surgimatixhart or phone. If you have a critical or abnormal lab result, we will notify you by phone as soon as possible.  Submit refill requests through Propel or call your pharmacy and they will forward the refill request to us. Please allow 3 business days for your refill to be completed.          Additional Information About Your Visit        MyChart Information     Propel gives you secure access to your electronic health record. If you see a primary care provider, you can also send messages to your care team and make appointments. If you have questions, please call your primary care clinic.  If you do not have a primary care provider, please call [phone] and they will assist you.        Care EveryWhere ID     This is your Care EveryWhere ID. This could be used by other organizations to access your [place] medical records  [id number]        Your Vitals Were     Pulse Respirations Last Period Pulse Oximetry          74 16 07/15/2018 99%         Blood Pressure from Last 3 Encounters:    08/14/18 115/74   08/13/18 94/53   05/15/18 112/69    Weight from Last 3 Encounters:   08/14/18 86.6 kg (191 lb)   05/15/18 81.2 kg (179 lb)   05/14/18 79.4 kg (175 lb)              We Performed the Following     Ingestion challenge test-up to 120 minutes        Primary Care Provider Office Phone # Fax #    Cecelia Gonzalez [phone]       [address]  [place] [address]        Equal Access to Services     [facility]: Hadii aad ku hadasho Soomaali, waaxda luqadaha, qaybta kaalmada adeegyada, waxernie newberry haybrandon morrow . So [place] [phone].    ATENCIÓN: Si habla español, tiene a magaña disposición servicios gratuitos de asistencia lingüística. LlOhio[facility] Hospital [phone].    We comply with applicable federal civil rights laws and [place] laws. We do not discriminate on the basis of race, color, national origin, age, disability, sex, sexual orientation, or gender identity.            Thank you!     Thank you for choosing [facility]  for your care. Our goal is always to provide you with excellent care. Hearing back from our patients is one way we can continue to improve our services. Please take a few minutes to complete the written survey that you may receive in the mail after your visit with us. Thank you!             Your Updated Medication List - Protect others around you: Learn how to safely use, store and throw away your medicines at www.disposemymeds.org.          This list is accurate as of 8/13/18 11:59 PM.  Always use your most recent med list.                   Brand Name Dispense Instructions for use Diagnosis    albuterol 108 (90 Base) MCG/ACT inhaler    PROAIR HFA/PROVENTIL HFA/VENTOLIN HFA    1 Inhaler    Inhale 2 puffs into the lungs every 6 hours as needed for shortness of breath / dyspnea or wheezing    Acute bronchospasm       EPINEPHrine 0.3 MG/0.3ML injection 2-pack    EPIPEN/ADRENACLICK/or ANY BX GENERIC EQUIV    2 each     Inject 0.3 mLs (0.3 mg) into the muscle once as needed for anaphylaxis    Dermatitis due to food taken internally       hydrOXYzine 25 MG tablet    ATARAX    90 tablet    Take 1-2 tablets (25-50 mg) by mouth every 6 hours as needed for anxiety    TJ (generalized anxiety disorder)       metroNIDAZOLE 0.75 % cream    METROCREAM    45 g    Apply once daily to forehead every morning    Acne vulgaris       MULTIVITAMIN & MINERAL PO           sulfacetamide sodium-sulfur 10-5 % Lotn     60 g    Apply once daily to forehead.    Dermatitis, seborrheic       * tretinoin 0.05 % cream    RETIN-A    45 g    Apply topically At Bedtime To back as directed    Acne vulgaris       * tretinoin 0.025 % cream    RETIN-A    45 g    Use every night on face as directed    Acne vulgaris       * Notice:  This list has 2 medication(s) that are the same as other medications prescribed for you. Read the directions carefully, and ask your doctor or other care provider to review them with you.

## 2018-08-13 NOTE — NURSING NOTE
Chief Complaint   Patient presents with     Allergy Consult     Patient is being seen for doxycycline allergy      Shelley Mae CMA at 12:44 PM on 8/13/2018

## 2018-08-13 NOTE — LETTER
8/13/2018       RE: Farrah Bowen  [address]  [place] [place] [facility] [address]     Dear Colleague,    Thank you for referring your patient, Farrah Boewn, to the [facility] at [facility]. Please see a copy of my visit note below.    Reason for Visit  Farrah Bowen is a 22 year old female who is referred by Cecelia Gonzalez for hives due to unclear source.    Allergy HPI  Farrah returns today to do her oral doxycycline challenge. She had a panick attack a few weeks ago and developed hives and she did take hydroxyzine and none since.      The patient was seen and examined by Winnie Angelo MD   Current Outpatient Prescriptions   Medication     albuterol (PROAIR HFA/PROVENTIL HFA/VENTOLIN HFA) 108 (90 BASE) MCG/ACT Inhaler     citalopram (CELEXA) 10 MG tablet     drospirenone-ethinyl estradiol (PRATIMA) 3-0.02 MG per tablet     hydrOXYzine (ATARAX) 25 MG tablet     metroNIDAZOLE (METROCREAM) 0.75 % cream     Multiple Vitamins-Minerals (MULTIVITAMIN & MINERAL PO)     propranolol (INDERAL) 10 MG tablet     sulfacetamide sodium-sulfur 10-5 % LOTN     tretinoin (RETIN-A) 0.025 % cream     tretinoin (RETIN-A) 0.05 % cream     EPINEPHrine (EPIPEN) 0.3 MG/0.3ML injection     No current facility-administered medications for this visit.      Allergies   Allergen Reactions     Amoxicillin Rash     Severe rash     Codeine Hives     Most opiods should be avoided     Doxycycline Hives     Seafood Hives     Swollen Lips       Social History     Social History     Marital status: Single     Spouse name: single     Number of children: 0     Years of education: N/A     Occupational History     student      Social History Main Topics     Smoking status: Never Smoker     Smokeless tobacco: Never Used     Alcohol use 0.0 oz/week     0 Standard drinks or equivalent per week      Comment: 3/ month     Drug use: No     Sexual activity: Yes     Partners: Male     Birth control/  protection: Condom, Pill     Other Topics Concern     Not on file     Social History Narrative     Past Medical History:   Diagnosis Date     Achilles tendonitis      Acne      TJ (generalized anxiety disorder) 10/6/2017     Hearing loss in right ear      Pneumonia of right upper lobe due to infectious organism (H) 11/27/2017     Recurrent otitis media     as a child, still sometimes has ear porblems especially on places     No past surgical history on file.  Family History   Problem Relation Age of Onset     Unknown/Adopted Father      Obesity Mother      Eye Disorder Paternal Grandmother      glaucoma     Breast Cancer Paternal Grandmother      Hypertension Paternal Grandmother      Glaucoma Paternal Grandmother      Anxiety Disorder Paternal Grandmother      Cancer Paternal Grandmother      Other Cancer Paternal Grandmother      Alzheimer Disease Other      Multiple Family Members     Hypertension Paternal Grandfather      Cerebrovascular Disease Paternal Grandfather      Cardiac Sudden Death Paternal Grandfather      Diabetes No family hx of      Thyroid Disease No family hx of      Macular Degeneration No family hx of          ROS   A complete ROS was otherwise negative except as noted in the HPI and the end of the note.  There were no vitals taken for this visit.  Exam:   GENERAL APPEARANCE: Well developed, well nourished, alert, and in no apparent distress.  EYES: PERRL, EOMI, conjunctiva clear non-injected  HENT: Nasal mucosa with no edema and no discharge. No nasal polyps.  No facial tenderness.  EARS: Canals clear, TMs normal  MOUTH: Oral mucosa is moist, without any lesions, no tonsillar enlargement, no oropharyngeal exudate.  NECK: Supple, no masses, no thyromegaly.  LYMPHATICS: No significant cervical, or supraclavicular nodes.  RESP: Good air flow throughout.  No crackles. No rhonchi. No wheezes.  CV: Normal S1, S2, regular rhythm, normal rate. No murmur.  No rub. No gallop. No LE edema.   MS:  Extremities normal. No clubbing. No cyanosis.  SKIN: No rashes noted  NEURO: Speech normal, normal strength and tone, normal gait and stance  PSYCH: Normal mentation, orientation to person, place, and time.  Results:    12:53 pm Doxycycline 25mg/5ml  0.1 ml given.   1:15 pm 25mg/5mL  2mL given.  1:34 pm 2 dime size flat red lesions on right cheek under her ear, she wanted to continue as the rest of the exam was normal so 10 ml given.  2:45 pm normal exam, discharged from clinic with return to care precautions      Assessment and plan:   Farrah completed the oral challenge of doxycycline in clinic today. She did not have a reaction to the challenge and was determined to not be allergic to doxycycline. She was discharged at 2:45 pm with a normal exam. I recommend she continue to avoid opioids. Additionally, if she develops a rash at home, she should take a picture and contact me. If she develops difficulty breathing or swelling of the lips, she should present to the ED. Farrah was understanding and in agreement with this plan.   With her reaction to escargot, she can choose to avoid this food or carry an epinephrine device. Since this a food that is typically easy to avoid, Farrah agreed that she would avoid the food rather than carry an epinephrine device.    Thank you for the opportunity to take part in the care of Farrah. She was seen and discussed with Dr. Sarmiento.    Winnie Angelo DO   [facility]     Physician Attestation   I, Sahil Sarmiento, saw this patient with the resident and agree with the resident and/or medical student's findings and plan of care as documented in the note.      I personally reviewed her ingestion challenge and decided on doses and discussed risks    Sahil Sarmiento MD  Date of Service (when I saw the patient): 8/13/18

## 2018-08-14 ENCOUNTER — OFFICE VISIT (OUTPATIENT)
Dept: FAMILY MEDICINE | Facility: CLINIC | Age: 23
End: 2018-08-14
Payer: COMMERCIAL

## 2018-08-14 VITALS
SYSTOLIC BLOOD PRESSURE: 115 MMHG | TEMPERATURE: 98.7 F | BODY MASS INDEX: 27.04 KG/M2 | HEART RATE: 67 BPM | WEIGHT: 191 LBS | DIASTOLIC BLOOD PRESSURE: 74 MMHG | OXYGEN SATURATION: 97 %

## 2018-08-14 DIAGNOSIS — Z00.00 ROUTINE HISTORY AND PHYSICAL EXAMINATION OF ADULT: Primary | ICD-10-CM

## 2018-08-14 DIAGNOSIS — R63.5 WEIGHT GAIN: ICD-10-CM

## 2018-08-14 DIAGNOSIS — F41.1 GAD (GENERALIZED ANXIETY DISORDER): ICD-10-CM

## 2018-08-14 DIAGNOSIS — L70.0 ACNE VULGARIS: ICD-10-CM

## 2018-08-14 PROCEDURE — G0145 SCR C/V CYTO,THINLAYER,RESCR: HCPCS | Performed by: INTERNAL MEDICINE

## 2018-08-14 PROCEDURE — 99395 PREV VISIT EST AGE 18-39: CPT | Performed by: INTERNAL MEDICINE

## 2018-08-14 RX ORDER — CITALOPRAM HYDROBROMIDE 10 MG/1
10 TABLET ORAL DAILY
Qty: 90 TABLET | Refills: 1 | Status: SHIPPED | OUTPATIENT
Start: 2018-08-14 | End: 2018-08-14

## 2018-08-14 RX ORDER — DROSPIRENONE AND ETHINYL ESTRADIOL 0.02-3(28)
1 KIT ORAL DAILY
Qty: 84 TABLET | Refills: 3 | Status: SHIPPED | OUTPATIENT
Start: 2018-08-14 | End: 2018-08-14

## 2018-08-14 RX ORDER — DROSPIRENONE AND ETHINYL ESTRADIOL 0.02-3(28)
1 KIT ORAL DAILY
Qty: 84 TABLET | Refills: 3 | Status: SHIPPED | OUTPATIENT
Start: 2018-08-14 | End: 2019-10-16

## 2018-08-14 RX ORDER — CITALOPRAM HYDROBROMIDE 10 MG/1
10 TABLET ORAL DAILY
Qty: 90 TABLET | Refills: 1 | Status: SHIPPED | OUTPATIENT
Start: 2018-08-14 | End: 2018-12-24

## 2018-08-14 RX ORDER — PROPRANOLOL HYDROCHLORIDE 10 MG/1
10 TABLET ORAL 3 TIMES DAILY PRN
Qty: 90 TABLET | Refills: 3 | Status: SHIPPED | OUTPATIENT
Start: 2018-08-14 | End: 2018-12-24

## 2018-08-14 RX ORDER — PROPRANOLOL HYDROCHLORIDE 10 MG/1
10 TABLET ORAL 3 TIMES DAILY PRN
Qty: 90 TABLET | Refills: 3 | Status: SHIPPED | OUTPATIENT
Start: 2018-08-14 | End: 2018-08-14

## 2018-08-14 NOTE — MR AVS SNAPSHOT
After Visit Summary   8/14/2018    Farrah Bowen    MRN: [id number]           Patient Information     Date Of Birth          1995        Visit Information        Provider Department      8/14/2018 1:20 PM Cecelia Gonzalez MD [facility] [place]        Today's Diagnoses     Routine history and physical examination of adult    -  1    Acne vulgaris        TJ (generalized anxiety disorder)          Care Instructions      Preventive Health Recommendations  Female Ages 21 to 25     Yearly exam:     See your health care provider every year in order to  o Review health changes.   o Discuss preventive care.    o Review your medicines if your doctor has prescribed any.      You should be tested each year for STDs (sexually transmitted diseases).       Talk to your provider about how often you should have cholesterol testing.      Get a Pap test every three years. If you have an abnormal result, your doctor may have you test more often.      If you are at risk for diabetes, you should have a diabetes test (fasting glucose).     Shots:     Get a flu shot each year.     Get a tetanus shot every 10 years.     Consider getting the shot (vaccine) that prevents cervical cancer (Gardasil).    Nutrition:     Eat at least 5 servings of fruits and vegetables each day.    Eat whole-grain bread, whole-wheat pasta and brown rice instead of white grains and rice.    Get adequate Calcium and Vitamin D.     Lifestyle    Exercise at least 150 minutes a week each week (30 minutes a day, 5 days a week). This will help you control your weight and prevent disease.    Limit alcohol to one drink per day.    No smoking.     Wear sunscreen to prevent skin cancer.    See your dentist every six months for an exam and cleaning.          Follow-ups after your visit        Who to contact     If you have questions or need follow up information about today's clinic visit or your schedule please contact [facility] [place] directly at [phone].  Normal or non-critical lab and imaging results will be communicated to you by nubelohart, letter or phone within 4 business days after the clinic has received the results. If you do not hear from us within 7 days, please contact the clinic through nubelohart or phone. If you have a critical or abnormal lab result, we will notify you by phone as soon as possible.  Submit refill requests through [employer] or call your pharmacy and they will forward the refill request to us. Please allow 3 business days for your refill to be completed.          Additional Information About Your Visit        [employer] Information     [employer] gives you secure access to your electronic health record. If you see a primary care provider, you can also send messages to your care team and make appointments. If you have questions, please call your primary care clinic.  If you do not have a primary care provider, please call [phone] and they will assist you.        Care EveryWhere ID     This is your Care EveryWhere ID. This could be used by other organizations to access your [place] medical records  [id number]        Your Vitals Were     Pulse Temperature Last Period Pulse Oximetry BMI (Body Mass Index)       67 98.7  F (37.1  C) (Oral) 07/15/2018 97% 27.04 kg/m2        Blood Pressure from Last 3 Encounters:   08/14/18 115/74   08/13/18 94/53   05/15/18 112/69    Weight from Last 3 Encounters:   08/14/18 191 lb (86.6 kg)   05/15/18 179 lb (81.2 kg)   05/14/18 175 lb (79.4 kg)              We Performed the Following     Pap imaged thin layer screen only - recommended age 21 - 24 years          Today's Medication Changes          These changes are accurate as of 8/14/18  1:44 PM.  If you have any questions, ask your nurse or doctor.               Start taking these medicines.        Dose/Directions    citalopram 10 MG tablet   Commonly known as:  celeXA   Used for:  TJ (generalized anxiety disorder)   Started by:   Cecelia Gonzalez MD        Dose:  10 mg   Take 1 tablet (10 mg) by mouth daily   Quantity:  90 tablet   Refills:  1       drospirenone-ethinyl estradiol 3-0.02 MG per tablet   Commonly known as:  PRATIMA   Used for:  Acne vulgaris   Started by:  Cecelia Gonzalez MD        Dose:  1 tablet   Take 1 tablet by mouth daily   Quantity:  84 tablet   Refills:  3       propranolol 10 MG tablet   Commonly known as:  INDERAL   Used for:  TJ (generalized anxiety disorder)   Started by:  Cecelia Gonzalez MD        Dose:  10 mg   Take 1 tablet (10 mg) by mouth 3 times daily as needed   Quantity:  90 tablet   Refills:  3            Where to get your medicines      These medications were sent to [facility]/pharmacy #[id number] - [place], [place] - [address]  [address] [address]     Phone:  [phone]     citalopram 10 MG tablet    drospirenone-ethinyl estradiol 3-0.02 MG per tablet    propranolol 10 MG tablet                Primary Care Provider Office Phone # Fax #    Cecelia Gonzalez [phone]        [address]  [place] [address]        Equal Access to Services     [facility]: Hadii aad ku hadasho Soomaali, waaxda luqadaha, qaybta kaalmada adeegyada, waxay julianin haygilbertn rolando morrow ah. So [facility] [phone].    ATENCIÓN: Si habla español, tiene a magaña disposición servicios gratuitos de asistencia lingüística. LlProMedica [facility] [phone].    We comply with applicable federal civil rights laws and [place] laws. We do not discriminate on the basis of race, color, national origin, age, disability, sex, sexual orientation, or gender identity.            Thank you!     Thank you for choosing [facility] [place]  for your care. Our goal is always to provide you with excellent care. Hearing back from our patients is one way we can continue to improve our services. Please take a few minutes to complete the written survey that you may receive in the mail after your visit with us. Thank  you!             Your Updated Medication List - Protect others around you: Learn how to safely use, store and throw away your medicines at www.disposemymeds.org.          This list is accurate as of 8/14/18  1:44 PM.  Always use your most recent med list.                   Brand Name Dispense Instructions for use Diagnosis    albuterol 108 (90 Base) MCG/ACT inhaler    PROAIR HFA/PROVENTIL HFA/VENTOLIN HFA    1 Inhaler    Inhale 2 puffs into the lungs every 6 hours as needed for shortness of breath / dyspnea or wheezing    Acute bronchospasm       citalopram 10 MG tablet    celeXA    90 tablet    Take 1 tablet (10 mg) by mouth daily    TJ (generalized anxiety disorder)       drospirenone-ethinyl estradiol 3-0.02 MG per tablet    PRATIMA    84 tablet    Take 1 tablet by mouth daily    Acne vulgaris       EPINEPHrine 0.3 MG/0.3ML injection 2-pack    EPIPEN/ADRENACLICK/or ANY BX GENERIC EQUIV    2 each    Inject 0.3 mLs (0.3 mg) into the muscle once as needed for anaphylaxis    Dermatitis due to food taken internally       hydrOXYzine 25 MG tablet    ATARAX    90 tablet    Take 1-2 tablets (25-50 mg) by mouth every 6 hours as needed for anxiety    TJ (generalized anxiety disorder)       metroNIDAZOLE 0.75 % cream    METROCREAM    45 g    Apply once daily to forehead every morning    Acne vulgaris       MULTIVITAMIN & MINERAL PO           propranolol 10 MG tablet    INDERAL    90 tablet    Take 1 tablet (10 mg) by mouth 3 times daily as needed    TJ (generalized anxiety disorder)       sulfacetamide sodium-sulfur 10-5 % Lotn     60 g    Apply once daily to forehead.    Dermatitis, seborrheic       * tretinoin 0.05 % cream    RETIN-A    45 g    Apply topically At Bedtime To back as directed    Acne vulgaris       * tretinoin 0.025 % cream    RETIN-A    45 g    Use every night on face as directed    Acne vulgaris       * Notice:  This list has 2 medication(s) that are the same as other medications prescribed for you. Read  the directions carefully, and ask your doctor or other care provider to review them with you.

## 2018-08-14 NOTE — PROGRESS NOTES
SUBJECTIVE:   CC: Farrah Bowen is an 22 year old woman who presents for preventive health visit.     Healthy Habits:    Do you get at least three servings of calcium containing foods daily (dairy, green leafy vegetables, etc.)? yes    Amount of exercise or daily activities, outside of work: 2 to 3 times a week     Problems taking medications regularly No    Medication side effects: No    Have you had an eye exam in the past two years? yes    Do you see a dentist twice per year? yes    Do you have sleep apnea, excessive snoring or daytime drowsiness?no    Farrah is concerned that she has gained some weight this past summer, about 10-15 lbs. She [place] 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 She admits to eating out a fair amount.     Started Citalopram 10 mg in January and still doing very well on this.      Today's PHQ-2 Score:   PHQ-2 ( 1999 [employer]) 12/15/2017 10/6/2017   Q1: Little interest or pleasure in doing things 0 2   Q2: Feeling down, depressed or hopeless 0 2   PHQ-2 Score 0 4   Q1: Little interest or pleasure in doing things - -   Q2: Feeling down, depressed or hopeless - -   PHQ-2 Score - -       Abuse: Current or Past(Physical, Sexual or Emotional)- No  Do you feel safe in your environment - Yes    Social History   Substance Use Topics     Smoking status: Never Smoker     Smokeless tobacco: Never Used     Alcohol use 0.0 oz/week     0 Standard drinks or equivalent per week      Comment: 3/ month     If you drink alcohol do you typically have >3 drinks per day or >7 drinks per week? No                     Reviewed orders with patient.  Reviewed health maintenance and updated orders accordingly - Yes  BP Readings from Last 3 Encounters:   08/14/18 115/74   08/13/18 94/53   05/15/18 112/69    Wt Readings from Last 3 Encounters:   08/14/18 191 lb (86.6 kg)   05/15/18 179 lb (81.2 kg)   05/14/18 175 lb (79.4 kg)                  Patient Active Problem List    Diagnosis     Acne vulgaris     Achilles tendonitis     Hearing loss in right ear     TJ (generalized anxiety disorder)     No past surgical history on file.    Social History   Substance Use Topics     Smoking status: Never Smoker     Smokeless tobacco: Never Used     Alcohol use 0.0 oz/week     0 Standard drinks or equivalent per week      Comment: 3/ month     Family History   Problem Relation Age of Onset     Unknown/Adopted Father      Obesity Mother      Eye Disorder Paternal Grandmother      glaucoma     Breast Cancer Paternal Grandmother      Hypertension Paternal Grandmother      Glaucoma Paternal Grandmother      Anxiety Disorder Paternal Grandmother      Cancer Paternal Grandmother      Other Cancer Paternal Grandmother      Alzheimer Disease Other      Multiple Family Members     Hypertension Paternal Grandfather      Cerebrovascular Disease Paternal Grandfather      Cardiac Sudden Death Paternal Grandfather      Diabetes No family hx of      Thyroid Disease No family hx of      Macular Degeneration No family hx of          Current Outpatient Prescriptions   Medication Sig Dispense Refill     albuterol (PROAIR HFA/PROVENTIL HFA/VENTOLIN HFA) 108 (90 BASE) MCG/ACT Inhaler Inhale 2 puffs into the lungs every 6 hours as needed for shortness of breath / dyspnea or wheezing 1 Inhaler 1     citalopram (CELEXA) 10 MG tablet Take 1 tablet (10 mg) by mouth daily 90 tablet 1     drospirenone-ethinyl estradiol (PRATIMA) 3-0.02 MG per tablet Take 1 tablet by mouth daily 84 tablet 3     EPINEPHrine (EPIPEN) 0.3 MG/0.3ML injection Inject 0.3 mLs (0.3 mg) into the muscle once as needed for anaphylaxis 2 each 1     hydrOXYzine (ATARAX) 25 MG tablet Take 1-2 tablets (25-50 mg) by mouth every 6 hours as needed for anxiety 90 tablet 1     metroNIDAZOLE (METROCREAM) 0.75 % cream Apply once daily to forehead every morning 45 g 1     Multiple Vitamins-Minerals (MULTIVITAMIN & MINERAL PO)        propranolol (INDERAL) 10 MG  tablet Take 1 tablet (10 mg) by mouth 3 times daily as needed 90 tablet 3     sulfacetamide sodium-sulfur 10-5 % LOTN Apply once daily to forehead. 60 g 5     tretinoin (RETIN-A) 0.025 % cream Use every night on face as directed 45 g 11     tretinoin (RETIN-A) 0.05 % cream Apply topically At Bedtime To back as directed 45 g 11     [DISCONTINUED] citalopram (CELEXA) 10 MG tablet Take 1 tablet (10 mg) by mouth daily 90 tablet 1     [DISCONTINUED] citalopram (CELEXA) 10 MG tablet Take 1 tablet (10 mg) by mouth daily 90 tablet 1     [DISCONTINUED] drospirenone-ethinyl estradiol (PRATIMA) 3-0.02 MG per tablet Take 1 tablet by mouth daily 84 tablet 3     [DISCONTINUED] drospirenone-ethinyl estradiol (PRATIMA) 3-0.02 MG per tablet Take 1 tablet by mouth daily 84 tablet 3     [DISCONTINUED] propranolol (INDERAL) 10 MG tablet Take 1 tablet (10 mg) by mouth 3 times daily as needed 90 tablet 3     [DISCONTINUED] propranolol (INDERAL) 10 MG tablet Take 1 tablet (10 mg) by mouth 3 times daily as needed 90 tablet 3       Mammogram not appropriate for this patient based on age.    Pertinent mammograms are reviewed under the imaging tab.  History of abnormal Pap smear: NO - age 21-29 PAP every 3 years recommended     Reviewed and updated as needed this visit by clinical staff  Tobacco  Allergies  Meds  Soc Hx        Reviewed and updated as needed this visit by Provider          ROS:  CONSTITUTIONAL: NEGATIVE for fever, chills, change in weight  INTEGUMENTARU/SKIN: NEGATIVE for worrisome rashes, moles or lesions  EYES: NEGATIVE for vision changes or irritation  ENT: NEGATIVE for ear, mouth and throat problems  RESP: NEGATIVE for significant cough or SOB  BREAST: NEGATIVE for masses, tenderness or discharge  CV: NEGATIVE for chest pain, palpitations or peripheral edema  GI: NEGATIVE for nausea, abdominal pain, heartburn, or change in bowel habits  : NEGATIVE for unusual urinary or vaginal symptoms. Periods are regular.  MUSCULOSKELETAL:  NEGATIVE for significant arthralgias or myalgia  NEURO: NEGATIVE for weakness, dizziness or paresthesias  PSYCHIATRIC: NEGATIVE for changes in mood or affect    OBJECTIVE:   /74 (BP Location: Left arm, Cuff Size: Adult Regular)  Pulse 67  Temp 98.7  F (37.1  C) (Oral)  Wt 191 lb (86.6 kg)  LMP 07/15/2018  SpO2 97%  BMI 27.04 kg/m2     EXAM:  GENERAL: healthy, alert and no distress  EYES: Eyes grossly normal to inspection, PERRL and conjunctivae and sclerae normal  HENT: ear canals and TM's normal, nose and mouth without ulcers or lesions  NECK: no adenopathy, no asymmetry, masses, or scars and thyroid normal to palpation  RESP: lungs clear to auscultation - no rales, rhonchi or wheezes  CV: regular rate and rhythm, normal S1 S2, no S3 or S4, no murmur, click or rub, no peripheral edema and peripheral pulses strong  ABDOMEN: soft, nontender, no hepatosplenomegaly, no masses and bowel sounds normal   (female): normal female external genitalia, normal urethral meatus, vaginal mucosa pink, moist, well rugated, and normal cervix/adnexa/uterus without masses or discharge  MS: no gross musculoskeletal defects noted, no edema  SKIN: no suspicious lesions or rashes  NEURO: Normal strength and tone, mentation intact and speech normal  PSYCH: mentation appears normal, affect normal/bright    Diagnostic Test Results:  none     ASSESSMENT/PLAN:   1. Routine history and physical examination of adult  - Pap imaged thin layer screen only - recommended age 21 - 24 years    2. Acne vulgaris  - drospirenone-ethinyl estradiol (PRATIMA) 3-0.02 MG per tablet; Take 1 tablet by mouth daily  Dispense: 84 tablet; Refill: 3    3. TJ (generalized anxiety disorder)  Doing well.  - citalopram (CELEXA) 10 MG tablet; Take 1 tablet (10 mg) by mouth daily  Dispense: 90 tablet; Refill: 1  - propranolol (INDERAL) 10 MG tablet; Take 1 tablet (10 mg) by mouth 3 times daily as needed  Dispense: 90 tablet; Refill: 3    4. Weight Gain:  Farrah herrera  "been eating out a lot this past summer which is likely the source of weight gain, but celexa could also be the cause. Recommending to observe weight over the next few months. If she continues to gain weight despite resuming her normal eating and exercise pattern, she will contact me and I will switch her to a different serotonin specific reuptake inhibitor, likely Zoloft.    COUNSELING:   Reviewed preventive health counseling, as reflected in patient instructions       Regular exercise       Healthy diet/nutrition       Vision screening       Hearing screening       Contraception    BP Readings from Last 1 Encounters:   08/13/18 94/53     Estimated body mass index is 27.04 kg/(m^2) as calculated from the following:    Height as of 5/15/18: 5' 10.47\" (1.79 m).    Weight as of this encounter: 191 lb (86.6 kg).           reports that she has never smoked. She has never used smokeless tobacco.      Counseling Resources:  ATP IV Guidelines  Pooled Cohorts Equation Calculator  Breast Cancer Risk Calculator  FRAX Risk Assessment  ICSI Preventive Guidelines  Dietary Guidelines for Americans, 2010  USDA's MyPlate  ASA Prophylaxis  Lung CA Screening    Cecelia Gonzalez MD  [facility]  "

## 2018-08-16 LAB
COPATH REPORT: NORMAL
PAP: NORMAL

## 2018-10-28 ENCOUNTER — E-VISIT (OUTPATIENT)
Dept: FAMILY MEDICINE | Facility: CLINIC | Age: 23
End: 2018-10-28
Payer: COMMERCIAL

## 2018-10-28 DIAGNOSIS — F32.1 CURRENT MODERATE EPISODE OF MAJOR DEPRESSIVE DISORDER WITHOUT PRIOR EPISODE (H): ICD-10-CM

## 2018-10-28 PROCEDURE — 99444 ZZC PHYSICIAN ONLINE EVALUATION & MANAGEMENT SERVICE: CPT | Performed by: INTERNAL MEDICINE

## 2018-10-28 RX ORDER — CITALOPRAM HYDROBROMIDE 20 MG/1
20 TABLET ORAL DAILY
Qty: 90 TABLET | Refills: 1 | Status: SHIPPED | OUTPATIENT
Start: 2018-10-28 | End: 2018-12-24

## 2018-10-28 ASSESSMENT — ANXIETY QUESTIONNAIRES
1. FEELING NERVOUS, ANXIOUS, OR ON EDGE: NEARLY EVERY DAY
3. WORRYING TOO MUCH ABOUT DIFFERENT THINGS: NEARLY EVERY DAY
5. BEING SO RESTLESS THAT IT IS HARD TO SIT STILL: SEVERAL DAYS
7. FEELING AFRAID AS IF SOMETHING AWFUL MIGHT HAPPEN: NEARLY EVERY DAY
4. TROUBLE RELAXING: NEARLY EVERY DAY
7. FEELING AFRAID AS IF SOMETHING AWFUL MIGHT HAPPEN: NEARLY EVERY DAY
GAD7 TOTAL SCORE: 19
GAD7 TOTAL SCORE: 19
6. BECOMING EASILY ANNOYED OR IRRITABLE: NEARLY EVERY DAY
2. NOT BEING ABLE TO STOP OR CONTROL WORRYING: NEARLY EVERY DAY
GAD7 TOTAL SCORE: 19

## 2018-10-28 ASSESSMENT — PATIENT HEALTH QUESTIONNAIRE - PHQ9
10. IF YOU CHECKED OFF ANY PROBLEMS, HOW DIFFICULT HAVE THESE PROBLEMS MADE IT FOR YOU TO DO YOUR WORK, TAKE CARE OF THINGS AT HOME, OR GET ALONG WITH OTHER PEOPLE: EXTREMELY DIFFICULT
SUM OF ALL RESPONSES TO PHQ QUESTIONS 1-9: 16
SUM OF ALL RESPONSES TO PHQ QUESTIONS 1-9: 16

## 2018-10-28 NOTE — MR AVS SNAPSHOT
After Visit Summary   10/28/2018    Farrah Bowen    MRN: [id number]           Patient Information     Date Of Birth          1995        Visit Information        Provider Department      10/28/2018 12:10 PM Cecelia Gonzalez MD [facility]        Today's Diagnoses     Current moderate episode of major depressive disorder without prior episode (H)          Care Instructions      Depression Affects Your Mind and Body  Everyone feels sad or  blue  from time to time for a few days or weeks. Depression is when these feelings don't go away and they interfere with daily life.  Depression is a real illness that can develop at any age. It is one of the most common mental health problems in the [place] Depression makes you feel sad, helpless, and hopeless. It gets in the way of your life and relationships. It inhibits your ability to think and act. But, with help, you can feel better again.      When I was depressed, I felt awful. I was so tired all the time I could hardly think, but at night I couldn t fall asleep. My head hurt. My stomach hurt. I didn t know what was wrong with me.    Depression affects your whole body  Brain chemicals affect your body as well as your mood. So depression may do more than just make you feel low. You may also feel bad physically. Depression can:    Cause trouble with mental tasks such as remembering, concentrating, or making decisions    Make you feel nervous and jumpy    Cause trouble sleeping. Or you may sleep too much    Change your appetite    Cause headaches, stomachaches, or other aches and pains    Drain your body of energy  Depression and other illness  It is common for people who have chronic health problems to also have depression. It can often be hard to tell which one caused the other. A person might become depressed after finding out they have a health problem. But some studies suggest being depressed may make certain health problems more  likely. And some depressed people stop taking care of themselves. This may make them more likely to get sick.  Date Last Reviewed: 1/1/2017 2000-2017 [employer]. [address] [place] [address], [place] [address]. All rights reserved. This information is not intended as a substitute for professional medical care. Always follow your healthcare professional's instructions.                Follow-ups after your visit        Follow-up notes from your care team     Return in about 4 weeks (around 11/25/2018) for Medication Follow up.      Who to contact     If you have questions or need follow up information about today's clinic visit or your schedule please contact [facility] directly at [phone].  Normal or non-critical lab and imaging results will be communicated to you by [employer], letter or phone within 4 business days after the clinic has received the results. If you do not hear from us within 7 days, please contact the clinic through [employer] or phone. If you have a critical or abnormal lab result, we will notify you by phone as soon as possible.  Submit refill requests through [employer] or call your pharmacy and they will forward the refill request to us. Please allow 3 business days for your refill to be completed.          Additional Information About Your Visit        Netology[employer] Information     [employer] gives you secure access to your electronic health record. If you see a primary care provider, you can also send messages to your care team and make appointments. If you have questions, please call your primary care clinic.  If you do not have a primary care provider, please call [phone] and they will assist you.        Care EveryWhere ID     This is your Care EveryWhere ID. This could be used by other organizations to access your [place] medical records  [id number]         Blood Pressure from Last 3 Encounters:   08/14/18 115/74   08/13/18 94/53   05/15/18 112/69    Weight from  Last 3 Encounters:   08/14/18 191 lb (86.6 kg)   05/15/18 179 lb (81.2 kg)   05/14/18 175 lb (79.4 kg)              Today, you had the following     No orders found for display         Today's Medication Changes          These changes are accurate as of 10/28/18  8:48 PM.  If you have any questions, ask your nurse or doctor.               These medicines have changed or have updated prescriptions.        Dose/Directions    * citalopram 10 MG tablet   Commonly known as:  celeXA   This may have changed:  Another medication with the same name was added. Make sure you understand how and when to take each.   Used for:  TJ (generalized anxiety disorder)   Changed by:  Cecelia Gonzalez MD        Dose:  10 mg   Take 1 tablet (10 mg) by mouth daily   Quantity:  90 tablet   Refills:  1       * citalopram 20 MG tablet   Commonly known as:  celeXA   This may have changed:  You were already taking a medication with the same name, and this prescription was added. Make sure you understand how and when to take each.   Used for:  Current moderate episode of major depressive disorder without prior episode (H)   Changed by:  Cecelia Gonzalez MD        Dose:  20 mg   Take 1 tablet (20 mg) by mouth daily   Quantity:  90 tablet   Refills:  1       * Notice:  This list has 2 medication(s) that are the same as other medications prescribed for you. Read the directions carefully, and ask your doctor or other care provider to review them with you.         Where to get your medicines      These medications were sent to [facility]/pharmacy #[id number] - [place], [place] - [address] [address]     Phone:  [phone]     citalopram 20 MG tablet                Primary Care Provider Office Phone # Fax #    Cecelia Gonzalez [phone]       [address] [place] [id number]        Equal Access to Services     JOSEPH SAWYER AH: Maribel Leroy, rinku allen,  rafael brightkristi frey'aan ah. So [facility] [phone].    ATENCIÓN: Si habla miguelangel, tiene a magaña disposición servicios gratuitos de asistencia lingüística. Lisa snyder [phone].    We comply with applicable federal civil rights laws and [place] laws. We do not discriminate on the basis of race, color, national origin, age, disability, sex, sexual orientation, or gender identity.            Thank you!     Thank you for choosing [facility]  for your care. Our goal is always to provide you with excellent care. Hearing back from our patients is one way we can continue to improve our services. Please take a few minutes to complete the written survey that you may receive in the mail after your visit with us. Thank you!             Your Updated Medication List - Protect others around you: Learn how to safely use, store and throw away your medicines at www.disposemymeds.org.          This list is accurate as of 10/28/18  8:48 PM.  Always use your most recent med list.                   Brand Name Dispense Instructions for use Diagnosis    albuterol 108 (90 Base) MCG/ACT inhaler    PROAIR HFA/PROVENTIL HFA/VENTOLIN HFA    1 Inhaler    Inhale 2 puffs into the lungs every 6 hours as needed for shortness of breath / dyspnea or wheezing    Acute bronchospasm       * citalopram 10 MG tablet    celeXA    90 tablet    Take 1 tablet (10 mg) by mouth daily    TJ (generalized anxiety disorder)       * citalopram 20 MG tablet    celeXA    90 tablet    Take 1 tablet (20 mg) by mouth daily    Current moderate episode of major depressive disorder without prior episode (H)       drospirenone-ethinyl estradiol 3-0.02 MG per tablet    PRATIMA    84 tablet    Take 1 tablet by mouth daily    Acne vulgaris       EPINEPHrine 0.3 MG/0.3ML injection 2-pack    EPIPEN/ADRENACLICK/or ANY BX GENERIC EQUIV    2 each    Inject 0.3 mLs (0.3 mg) into the muscle once as needed for anaphylaxis    Dermatitis due to food taken  internally       hydrOXYzine 25 MG tablet    ATARAX    90 tablet    Take 1-2 tablets (25-50 mg) by mouth every 6 hours as needed for anxiety    TJ (generalized anxiety disorder)       metroNIDAZOLE 0.75 % cream    METROCREAM    45 g    Apply once daily to forehead every morning    Acne vulgaris       MULTIVITAMIN & MINERAL PO           propranolol 10 MG tablet    INDERAL    90 tablet    Take 1 tablet (10 mg) by mouth 3 times daily as needed    TJ (generalized anxiety disorder)       sulfacetamide sodium-sulfur 10-5 % Lotn     60 g    Apply once daily to forehead.    Dermatitis, seborrheic       * tretinoin 0.05 % cream    RETIN-A    45 g    Apply topically At Bedtime To back as directed    Acne vulgaris       * tretinoin 0.025 % cream    RETIN-A    45 g    Use every night on face as directed    Acne vulgaris       * Notice:  This list has 4 medication(s) that are the same as other medications prescribed for you. Read the directions carefully, and ask your doctor or other care provider to review them with you.

## 2018-10-29 ASSESSMENT — ANXIETY QUESTIONNAIRES: GAD7 TOTAL SCORE: 19

## 2018-10-29 ASSESSMENT — PATIENT HEALTH QUESTIONNAIRE - PHQ9: SUM OF ALL RESPONSES TO PHQ QUESTIONS 1-9: 16

## 2018-10-29 NOTE — PATIENT INSTRUCTIONS
Depression Affects Your Mind and Body  Everyone feels sad or  blue  from time to time for a few days or weeks. Depression is when these feelings don't go away and they interfere with daily life.  Depression is a real illness that can develop at any age. It is one of the most common mental health problems in the [place] Depression makes you feel sad, helpless, and hopeless. It gets in the way of your life and relationships. It inhibits your ability to think and act. But, with help, you can feel better again.      When I was depressed, I felt awful. I was so tired all the time I could hardly think, but at night I couldn t fall asleep. My head hurt. My stomach hurt. I didn t know what was wrong with me.    Depression affects your whole body  Brain chemicals affect your body as well as your mood. So depression may do more than just make you feel low. You may also feel bad physically. Depression can:    Cause trouble with mental tasks such as remembering, concentrating, or making decisions    Make you feel nervous and jumpy    Cause trouble sleeping. Or you may sleep too much    Change your appetite    Cause headaches, stomachaches, or other aches and pains    Drain your body of energy  Depression and other illness  It is common for people who have chronic health problems to also have depression. It can often be hard to tell which one caused the other. A person might become depressed after finding out they have a health problem. But some studies suggest being depressed may make certain health problems more likely. And some depressed people stop taking care of themselves. This may make them more likely to get sick.  Date Last Reviewed: 1/1/2017 2000-2017 [employer]. [address] [place] [address], [place] [address]. All rights reserved. This information is not intended as a substitute for professional medical care. Always follow your healthcare professional's instructions.

## 2018-12-24 ENCOUNTER — OFFICE VISIT (OUTPATIENT)
Dept: FAMILY MEDICINE | Facility: CLINIC | Age: 23
End: 2018-12-24
Payer: COMMERCIAL

## 2018-12-24 VITALS
DIASTOLIC BLOOD PRESSURE: 70 MMHG | TEMPERATURE: 97.5 F | BODY MASS INDEX: 26.05 KG/M2 | OXYGEN SATURATION: 100 % | WEIGHT: 184 LBS | SYSTOLIC BLOOD PRESSURE: 102 MMHG | HEART RATE: 99 BPM

## 2018-12-24 DIAGNOSIS — Z23 NEED FOR PROPHYLACTIC VACCINATION AND INOCULATION AGAINST INFLUENZA: ICD-10-CM

## 2018-12-24 DIAGNOSIS — F32.4 MAJOR DEPRESSIVE DISORDER WITH SINGLE EPISODE, IN PARTIAL REMISSION (H): ICD-10-CM

## 2018-12-24 DIAGNOSIS — F41.1 GAD (GENERALIZED ANXIETY DISORDER): Primary | ICD-10-CM

## 2018-12-24 PROCEDURE — 90471 IMMUNIZATION ADMIN: CPT | Performed by: INTERNAL MEDICINE

## 2018-12-24 PROCEDURE — 99214 OFFICE O/P EST MOD 30 MIN: CPT | Mod: 25 | Performed by: INTERNAL MEDICINE

## 2018-12-24 PROCEDURE — 90686 IIV4 VACC NO PRSV 0.5 ML IM: CPT | Performed by: INTERNAL MEDICINE

## 2018-12-24 RX ORDER — CITALOPRAM HYDROBROMIDE 20 MG/1
20 TABLET ORAL DAILY
Qty: 90 TABLET | Refills: 1 | Status: SHIPPED | OUTPATIENT
Start: 2018-12-24 | End: 2019-05-27

## 2018-12-24 RX ORDER — PROPRANOLOL HYDROCHLORIDE 10 MG/1
10 TABLET ORAL 3 TIMES DAILY PRN
Qty: 90 TABLET | Refills: 3 | Status: SHIPPED | OUTPATIENT
Start: 2018-12-24 | End: 2019-05-27

## 2018-12-24 ASSESSMENT — ANXIETY QUESTIONNAIRES
1. FEELING NERVOUS, ANXIOUS, OR ON EDGE: SEVERAL DAYS
7. FEELING AFRAID AS IF SOMETHING AWFUL MIGHT HAPPEN: NOT AT ALL
IF YOU CHECKED OFF ANY PROBLEMS ON THIS QUESTIONNAIRE, HOW DIFFICULT HAVE THESE PROBLEMS MADE IT FOR YOU TO DO YOUR WORK, TAKE CARE OF THINGS AT HOME, OR GET ALONG WITH OTHER PEOPLE: SOMEWHAT DIFFICULT
5. BEING SO RESTLESS THAT IT IS HARD TO SIT STILL: NOT AT ALL
3. WORRYING TOO MUCH ABOUT DIFFERENT THINGS: SEVERAL DAYS
GAD7 TOTAL SCORE: 2
6. BECOMING EASILY ANNOYED OR IRRITABLE: NOT AT ALL
2. NOT BEING ABLE TO STOP OR CONTROL WORRYING: NOT AT ALL

## 2018-12-24 ASSESSMENT — PATIENT HEALTH QUESTIONNAIRE - PHQ9
SUM OF ALL RESPONSES TO PHQ QUESTIONS 1-9: 3
5. POOR APPETITE OR OVEREATING: NOT AT ALL

## 2018-12-24 NOTE — PROGRESS NOTES
Injectable Influenza Immunization Documentation    1.  Is the person to be vaccinated sick today?   No    2. Does the person to be vaccinated have an allergy to a component   of the vaccine?   No  Egg Allergy Algorithm Link    3. Has the person to be vaccinated ever had a serious reaction   to influenza vaccine in the past?   No    4. Has the person to be vaccinated ever had Guillain-Barré syndrome?   No    Form completed by Elizabeth Villela MA            SUBJECTIVE:   Farrah Bowen is a 23 year old female who presents to clinic today for the following health issues:      Medication Followup of  Celexa      Taking Medication as prescribed: yes    Side Effects:  None    Medication Helping Symptoms:  yes     Farrah is a 24 y/o female with generalized anxiety disorder. She has tried many non-medication approaches to treating her anxiety but found that as school kept going she couldn't perform any longer and she was withdrawing from people. She scored very poorly on a test and threw up from her anxiety so sent me a [employer] message requesting a prescription. She is now on Citalopram 20 mg daily and doing really well on this. She initially had some stomach problems       Problem list and histories reviewed & adjusted, as indicated.  Additional history: as documented    Patient Active Problem List   Diagnosis     Acne vulgaris     Achilles tendonitis     Hearing loss in right ear     TJ (generalized anxiety disorder)     Current moderate episode of major depressive disorder without prior episode (H)     No past surgical history on file.    Social History     Tobacco Use     Smoking status: Never Smoker     Smokeless tobacco: Never Used   Substance Use Topics     Alcohol use: Yes     Alcohol/week: 0.0 oz     Comment: 3/ month     Family History   Problem Relation Age of Onset     Unknown/Adopted Father      Obesity Mother      Eye Disorder Paternal Grandmother         glaucoma     Breast Cancer Paternal Grandmother       Hypertension Paternal Grandmother      Glaucoma Paternal Grandmother      Anxiety Disorder Paternal Grandmother      Cancer Paternal Grandmother      Other Cancer Paternal Grandmother      Alzheimer Disease Other         Multiple Family Members     Hypertension Paternal Grandfather      Cerebrovascular Disease Paternal Grandfather      Cardiac Sudden Death Paternal Grandfather      Diabetes No family hx of      Thyroid Disease No family hx of      Macular Degeneration No family hx of            Reviewed and updated as needed this visit by clinical staff  Tobacco  Allergies       Reviewed and updated as needed this visit by Provider         ROS:  Constitutional, HEENT, cardiovascular, pulmonary, gi and gu systems are negative, except as otherwise noted.    OBJECTIVE:     /70   Pulse 99   Temp 97.5  F (36.4  C) (Tympanic)   Wt 83.5 kg (184 lb)   LMP 12/10/2018   SpO2 100%   BMI 26.05 kg/m    Body mass index is 26.05 kg/m .  GENERAL: healthy, alert and no distress  NECK: no adenopathy, no asymmetry, masses, or scars and thyroid normal to palpation  RESP: lungs clear to auscultation - no rales, rhonchi or wheezes  CV: regular rate and rhythm, normal S1 S2, no S3 or S4, no murmur, click or rub, no peripheral edema and peripheral pulses strong  MS: no gross musculoskeletal defects noted, no edema    Diagnostic Test Results:  none     ASSESSMENT/PLAN:       1. TJ (generalized anxiety disorder)  Doing very well on Citalopram. Continuing 20 mg daily along with Propranolol PRN.  - propranolol (INDERAL) 10 MG tablet; Take 1 tablet (10 mg) by mouth 3 times daily as needed  Dispense: 90 tablet; Refill: 3    2. Major depressive disorder with single episode, in partial remission.  - citalopram (CELEXA) 20 MG tablet; Take 1 tablet (20 mg) by mouth daily  Dispense: 90 tablet; Refill: 1    3. Need for prophylactic vaccination and inoculation against influenza  - FLU VACCINE, SPLIT VIRUS, IM (QUADRIVALENT) [25859]- >3  YRS  - Vaccine Administration, Initial [51008]    Follow up in 6 months via E-Visit.      Cecelia Gonzalez MD  [facility]

## 2018-12-25 ASSESSMENT — ANXIETY QUESTIONNAIRES: GAD7 TOTAL SCORE: 2

## 2019-05-27 ENCOUNTER — E-VISIT (OUTPATIENT)
Dept: FAMILY MEDICINE | Facility: CLINIC | Age: 24
End: 2019-05-27
Payer: COMMERCIAL

## 2019-05-27 ENCOUNTER — MYC REFILL (OUTPATIENT)
Dept: DERMATOLOGY | Facility: CLINIC | Age: 24
End: 2019-05-27

## 2019-05-27 DIAGNOSIS — F41.1 GAD (GENERALIZED ANXIETY DISORDER): ICD-10-CM

## 2019-05-27 DIAGNOSIS — F32.4 MAJOR DEPRESSIVE DISORDER WITH SINGLE EPISODE, IN PARTIAL REMISSION (H): ICD-10-CM

## 2019-05-27 DIAGNOSIS — L70.0 ACNE VULGARIS: ICD-10-CM

## 2019-05-27 PROCEDURE — 99444 ZZC PHYSICIAN ONLINE EVALUATION & MANAGEMENT SERVICE: CPT | Performed by: INTERNAL MEDICINE

## 2019-05-27 RX ORDER — CITALOPRAM HYDROBROMIDE 20 MG/1
20 TABLET ORAL DAILY
Qty: 90 TABLET | Refills: 1 | Status: SHIPPED | OUTPATIENT
Start: 2019-05-27 | End: 2019-11-27

## 2019-05-27 RX ORDER — PROPRANOLOL HYDROCHLORIDE 10 MG/1
10 TABLET ORAL 3 TIMES DAILY PRN
Qty: 90 TABLET | Refills: 3 | Status: SHIPPED | OUTPATIENT
Start: 2019-05-27 | End: 2019-11-27

## 2019-05-28 RX ORDER — TRETINOIN 0.5 MG/G
CREAM TOPICAL AT BEDTIME
Qty: 0.1 G | Refills: 0 | OUTPATIENT
Start: 2019-05-28

## 2019-10-16 DIAGNOSIS — L70.0 ACNE VULGARIS: ICD-10-CM

## 2019-10-16 RX ORDER — DROSPIRENONE AND ETHINYL ESTRADIOL 0.02-3(28)
KIT ORAL
Qty: 84 TABLET | Refills: 0 | Status: SHIPPED | OUTPATIENT
Start: 2019-10-16 | End: 2019-11-27

## 2019-10-16 NOTE — TELEPHONE ENCOUNTER
"Requested Prescriptions   Pending Prescriptions Disp Refills     ALICIA 3-0.02 MG tablet [Pharmacy Med Name: ALICIA 3 MG-0.02 MG TABLET] 84 tablet 2     Sig: TAKE 1 TABLET BY MOUTH EVERY DAY       Contraceptives Protocol Passed - 10/16/2019 10:04 AM        Passed - Patient is not a current smoker if age is 35 or older        Passed - Recent (12 mo) or future (30 days) visit within the authorizing provider's specialty     Patient has had an office visit with the authorizing provider or a provider within the authorizing providers department within the previous 12 mos or has a future within next 30 days. See \"Patient Info\" tab in inbasket, or \"Choose Columns\" in Meds & Orders section of the refill encounter.              Passed - Medication is active on med list        Passed - No active pregnancy on record        Passed - No positive pregnancy test in past 12 months        Prescription approved per [facility] Refill Protocol.  Laine Quintana RN   [facility] - Triage     "

## 2019-11-27 ENCOUNTER — OFFICE VISIT (OUTPATIENT)
Dept: FAMILY MEDICINE | Facility: CLINIC | Age: 24
End: 2019-11-27
Payer: COMMERCIAL

## 2019-11-27 VITALS
DIASTOLIC BLOOD PRESSURE: 70 MMHG | HEIGHT: 71 IN | SYSTOLIC BLOOD PRESSURE: 120 MMHG | BODY MASS INDEX: 26.74 KG/M2 | TEMPERATURE: 98.7 F | HEART RATE: 75 BPM | WEIGHT: 191 LBS

## 2019-11-27 DIAGNOSIS — F41.1 GAD (GENERALIZED ANXIETY DISORDER): ICD-10-CM

## 2019-11-27 DIAGNOSIS — Z13.1 SCREENING FOR DIABETES MELLITUS: ICD-10-CM

## 2019-11-27 DIAGNOSIS — F32.4 MAJOR DEPRESSIVE DISORDER WITH SINGLE EPISODE, IN PARTIAL REMISSION (H): ICD-10-CM

## 2019-11-27 DIAGNOSIS — Z13.220 SCREENING FOR HYPERLIPIDEMIA: ICD-10-CM

## 2019-11-27 DIAGNOSIS — Z23 NEED FOR PROPHYLACTIC VACCINATION AND INOCULATION AGAINST INFLUENZA: ICD-10-CM

## 2019-11-27 DIAGNOSIS — Z00.00 ROUTINE GENERAL MEDICAL EXAMINATION AT A HEALTH CARE FACILITY: Primary | ICD-10-CM

## 2019-11-27 DIAGNOSIS — Z23 NEED FOR VACCINATION: ICD-10-CM

## 2019-11-27 DIAGNOSIS — L70.0 ACNE VULGARIS: ICD-10-CM

## 2019-11-27 DIAGNOSIS — Z11.3 SCREEN FOR STD (SEXUALLY TRANSMITTED DISEASE): ICD-10-CM

## 2019-11-27 LAB
CHOLEST SERPL-MCNC: 151 MG/DL
ERYTHROCYTE [DISTWIDTH] IN BLOOD BY AUTOMATED COUNT: 12.3 % (ref 10–15)
GLUCOSE SERPL-MCNC: 86 MG/DL (ref 70–99)
HCT VFR BLD AUTO: 33.7 % (ref 35–47)
HDLC SERPL-MCNC: 96 MG/DL
HGB BLD-MCNC: 11 G/DL (ref 11.7–15.7)
HIV 1+2 AB+HIV1 P24 AG SERPL QL IA: NONREACTIVE
LDLC SERPL CALC-MCNC: 43 MG/DL
MCH RBC QN AUTO: 29.7 PG (ref 26.5–33)
MCHC RBC AUTO-ENTMCNC: 32.6 G/DL (ref 31.5–36.5)
MCV RBC AUTO: 91 FL (ref 78–100)
NONHDLC SERPL-MCNC: 55 MG/DL
PLATELET # BLD AUTO: 243 10E9/L (ref 150–450)
RBC # BLD AUTO: 3.7 10E12/L (ref 3.8–5.2)
TRIGL SERPL-MCNC: 59 MG/DL
WBC # BLD AUTO: 6 10E9/L (ref 4–11)

## 2019-11-27 PROCEDURE — 85027 COMPLETE CBC AUTOMATED: CPT | Performed by: INTERNAL MEDICINE

## 2019-11-27 PROCEDURE — 99213 OFFICE O/P EST LOW 20 MIN: CPT | Mod: 25 | Performed by: INTERNAL MEDICINE

## 2019-11-27 PROCEDURE — 36415 COLL VENOUS BLD VENIPUNCTURE: CPT | Performed by: INTERNAL MEDICINE

## 2019-11-27 PROCEDURE — 90471 IMMUNIZATION ADMIN: CPT | Performed by: INTERNAL MEDICINE

## 2019-11-27 PROCEDURE — 80061 LIPID PANEL: CPT | Performed by: INTERNAL MEDICINE

## 2019-11-27 PROCEDURE — 87389 HIV-1 AG W/HIV-1&-2 AB AG IA: CPT | Performed by: INTERNAL MEDICINE

## 2019-11-27 PROCEDURE — 90714 TD VACC NO PRESV 7 YRS+ IM: CPT | Performed by: INTERNAL MEDICINE

## 2019-11-27 PROCEDURE — 90686 IIV4 VACC NO PRSV 0.5 ML IM: CPT | Performed by: INTERNAL MEDICINE

## 2019-11-27 PROCEDURE — 90472 IMMUNIZATION ADMIN EACH ADD: CPT | Performed by: INTERNAL MEDICINE

## 2019-11-27 PROCEDURE — 82947 ASSAY GLUCOSE BLOOD QUANT: CPT | Performed by: INTERNAL MEDICINE

## 2019-11-27 PROCEDURE — 99395 PREV VISIT EST AGE 18-39: CPT | Mod: 25 | Performed by: INTERNAL MEDICINE

## 2019-11-27 PROCEDURE — 87491 CHLMYD TRACH DNA AMP PROBE: CPT | Performed by: INTERNAL MEDICINE

## 2019-11-27 RX ORDER — DROSPIRENONE AND ETHINYL ESTRADIOL 0.02-3(28)
1 KIT ORAL DAILY
Qty: 84 TABLET | Refills: 0 | Status: SHIPPED | OUTPATIENT
Start: 2019-11-27 | End: 2020-01-13

## 2019-11-27 RX ORDER — PROPRANOLOL HYDROCHLORIDE 20 MG/1
20 TABLET ORAL 3 TIMES DAILY PRN
Qty: 90 TABLET | Refills: 1 | Status: SHIPPED | OUTPATIENT
Start: 2019-11-27 | End: 2020-12-03

## 2019-11-27 RX ORDER — TRETINOIN 0.5 MG/G
CREAM TOPICAL
Qty: 45 G | Refills: 11 | Status: SHIPPED | OUTPATIENT
Start: 2019-11-27

## 2019-11-27 RX ORDER — CITALOPRAM HYDROBROMIDE 20 MG/1
20 TABLET ORAL DAILY
Qty: 90 TABLET | Refills: 3 | Status: SHIPPED | OUTPATIENT
Start: 2019-11-27 | End: 2020-04-23

## 2019-11-27 ASSESSMENT — PATIENT HEALTH QUESTIONNAIRE - PHQ9
5. POOR APPETITE OR OVEREATING: NOT AT ALL
SUM OF ALL RESPONSES TO PHQ QUESTIONS 1-9: 0

## 2019-11-27 ASSESSMENT — ANXIETY QUESTIONNAIRES
GAD7 TOTAL SCORE: 5
3. WORRYING TOO MUCH ABOUT DIFFERENT THINGS: SEVERAL DAYS
1. FEELING NERVOUS, ANXIOUS, OR ON EDGE: SEVERAL DAYS
2. NOT BEING ABLE TO STOP OR CONTROL WORRYING: SEVERAL DAYS
6. BECOMING EASILY ANNOYED OR IRRITABLE: SEVERAL DAYS
5. BEING SO RESTLESS THAT IT IS HARD TO SIT STILL: NOT AT ALL
7. FEELING AFRAID AS IF SOMETHING AWFUL MIGHT HAPPEN: SEVERAL DAYS

## 2019-11-27 ASSESSMENT — MIFFLIN-ST. JEOR: SCORE: 1717.5

## 2019-11-27 NOTE — PROGRESS NOTES
SUBJECTIVE:   CC: Farrah Bowen is an 24 year old woman who presents for preventive health visit.     Healthy Habits:    Do you get at least three servings of calcium containing foods daily (dairy, green leafy vegetables, etc.)? yes    Amount of exercise or daily activities, outside of work: 1 day(s) per week    Problems taking medications regularly No    Medication side effects: No    Have you had an eye exam in the past two years? yes    Do you see a dentist twice per year? yes    Do you have sleep apnea, excessive snoring or daytime drowsiness?no      PROBLEMS TO ADD ON... 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  She is at home this weekend for the Thanksgiving holiday.  She continues to take citalopram 20 mg daily and this works very well for her anxiety and depression.  She occasionally still uses propranolol for symptoms associated with her anxiety. She is sexually active but uses protection.  She is requesting STD testing today although she is asymptomatic.      Today's PHQ-2 Score:   PHQ-2 ( 1999 Pfizer) 12/15/2017 10/6/2017   Q1: Little interest or pleasure in doing things 0 2   Q2: Feeling down, depressed or hopeless 0 2   PHQ-2 Score 0 4   Q1: Little interest or pleasure in doing things - -   Q2: Feeling down, depressed or hopeless - -   PHQ-2 Score - -       Abuse: Current or Past(Physical, Sexual or Emotional)- NO  Do you feel safe in your environment? YES        Social History     Tobacco Use     Smoking status: Never Smoker     Smokeless tobacco: Never Used   Substance Use Topics     Alcohol use: Yes     Alcohol/week: 0.0 standard drinks     Comment: 3/ month     If you drink alcohol do you typically have >3 drinks per day or >7 drinks per week? No                     Reviewed orders with patient.  Reviewed health maintenance and updated orders accordingly -  "Yes  BP Readings from Last 3 Encounters:   11/27/19 120/70   12/24/18 102/70   08/14/18 115/74    Wt Readings from Last 3 Encounters:   11/27/19 86.6 kg (191 lb)   12/24/18 83.5 kg (184 lb)   08/14/18 86.6 kg (191 lb)                    Mammogram not appropriate for this patient based on age.    Pertinent mammograms are reviewed under the imaging tab.  History of abnormal Pap smear: NO - age 21-29 PAP every 3 years recommended  PAP / HPV 8/14/2018   PAP NIL     Reviewed and updated as needed this visit by clinical staff         Reviewed and updated as needed this visit by Provider            ROS:  CONSTITUTIONAL: NEGATIVE for fever, chills, change in weight  INTEGUMENTARU/SKIN: NEGATIVE for worrisome rashes, moles or lesions  EYES: NEGATIVE for vision changes or irritation  ENT: NEGATIVE for ear, mouth and throat problems  RESP: NEGATIVE for significant cough or SOB  BREAST: NEGATIVE for masses, tenderness or discharge  CV: NEGATIVE for chest pain, palpitations or peripheral edema  GI: NEGATIVE for nausea, abdominal pain, heartburn, or change in bowel habits  : NEGATIVE for unusual urinary or vaginal symptoms. Periods are regular.  MUSCULOSKELETAL: NEGATIVE for significant arthralgias or myalgia  NEURO: NEGATIVE for weakness, dizziness or paresthesias  PSYCHIATRIC: NEGATIVE for changes in mood or affect    OBJECTIVE:   /70 (BP Location: Left arm, Patient Position: Chair, Cuff Size: Adult Regular)   Pulse 75   Temp 98.7  F (37.1  C) (Tympanic)   Ht 1.803 m (5' 11\")   Wt 86.6 kg (191 lb)   LMP 10/27/2019   BMI 26.64 kg/m    EXAM:  GENERAL: healthy, alert and no distress  EYES: Eyes grossly normal to inspection, PERRL and conjunctivae and sclerae normal  HENT: ear canals and TM's normal, nose and mouth without ulcers or lesions  NECK: no adenopathy, no asymmetry, masses, or scars and thyroid normal to palpation  RESP: lungs clear to auscultation - no rales, rhonchi or wheezes  CV: regular rate and rhythm, " normal S1 S2, no S3 or S4, no murmur, click or rub, no peripheral edema and peripheral pulses strong  ABDOMEN: soft, nontender, no hepatosplenomegaly, no masses and bowel sounds normal  MS: no gross musculoskeletal defects noted, no edema  SKIN: no suspicious lesions or rashes  NEURO: Normal strength and tone, mentation intact and speech normal  PSYCH: mentation appears normal, affect normal/bright    Diagnostic Test Results:  Labs reviewed in Epic    ASSESSMENT/PLAN:   1. Routine general medical examination at a health care facility  Pap due in 2021. Updating vaccines today.     2. Major depressive disorder with single episode, in partial remission (H)  Doing very well. PHQ-9 < 4  - citalopram (CELEXA) 20 MG tablet; Take 1 tablet (20 mg) by mouth daily  Dispense: 90 tablet; Refill: 3    3. Acne vulgaris  - drospirenone-ethinyl estradiol (ALICIA) 3-0.02 MG tablet; Take 1 tablet by mouth daily  Dispense: 84 tablet; Refill: 0  - tretinoin (RETIN-A) 0.05 % external cream; Apply to back as directed  Dispense: 45 g; Refill: 11    4. Need for prophylactic vaccination and inoculation against influenza  - Vaccine Administration, Initial [02229]    5. TJ (generalized anxiety disorder)  - propranolol (INDERAL) 20 MG tablet; Take 1 tablet (20 mg) by mouth 3 times daily as needed  Dispense: 90 tablet; Refill: 1    6. Screen for STD (sexually transmitted disease)  - HIV Antigen Antibody Combo  - Chlamydia trachomatis PCR    7. Screening for hyperlipidemia  - CBC with platelets  - Lipid panel reflex to direct LDL Fasting    8. Screening for diabetes mellitus  - Glucose    9. Need for vaccination  - TD PRSERV FREE >=7 YRS ADS IM [16462]  - Each additional admin.  (Right click and add QUANTITY)  [04319]    COUNSELING:   Reviewed preventive health counseling, as reflected in patient instructions       Regular exercise       Healthy diet/nutrition       Contraception    Estimated body mass index is 26.05 kg/m  as calculated from the  "following:    Height as of 5/15/18: 1.79 m (5' 10.47\").    Weight as of 12/24/18: 83.5 kg (184 lb).         reports that she has never smoked. She has never used smokeless tobacco.      Counseling Resources:  ATP IV Guidelines  Pooled Cohorts Equation Calculator  Breast Cancer Risk Calculator  FRAX Risk Assessment  ICSI Preventive Guidelines  Dietary Guidelines for Americans, 2010  USDA's MyPlate  ASA Prophylaxis  Lung CA Screening    Cecelia Gonzalez MD  [facility]  "

## 2019-11-28 ASSESSMENT — ANXIETY QUESTIONNAIRES: GAD7 TOTAL SCORE: 5

## 2019-11-29 ENCOUNTER — TELEPHONE (OUTPATIENT)
Dept: FAMILY MEDICINE | Facility: CLINIC | Age: 24
End: 2019-11-29

## 2019-11-29 LAB
C TRACH DNA SPEC QL NAA+PROBE: NEGATIVE
SPECIMEN SOURCE: NORMAL

## 2019-11-29 NOTE — TELEPHONE ENCOUNTER
Prior Authorization Specialty Medication Request    Medication/Dose: Tretinoin 0.05% cream   ICD code (if different than what is on RX):    Previously Tried and Failed:      Important Lab Values:   Rationale:     Insurance Name:   Insurance ID: [id number]  Insurance Phone Number:     Pharmacy Information (if different than what is on RX)  Name:    Phone:

## 2019-12-02 NOTE — TELEPHONE ENCOUNTER
Prior Authorization Approval    Authorization Effective Date: 12/2/2019  Authorization Expiration Date: 12/1/2020  Medication: TRETINOIN 0.05% CREAM -APPROVED  Approved Dose/Quantity:   Reference #:     Insurance Company: [employer] - Phone [phone] Fax [phone]  Expected CoPay:       CoPay Card Available:      Foundation Assistance Needed:    Which Pharmacy is filling the prescription (Not needed for infusion/clinic administered): [id number] [place] [id number] [place] [facility] [address]  Pharmacy Notified: Yes  Patient Notified: No    Pharmacy will notify patient when medication is ready.

## 2019-12-02 NOTE — TELEPHONE ENCOUNTER
Central Prior Authorization Team   Phone: [phone]      PA Initiation    Medication: TRETINOIN 0.05% CREAM -Initiated  Insurance Company: [employer] - Phone [phone] Fax [phone]  Pharmacy Filling the Rx: [id number] [place] [id number] AT [facility] [address]  Filling Pharmacy Phone: [phone]  Filling Pharmacy Fax:    Start Date: 12/2/2019

## 2019-12-26 NOTE — TELEPHONE ENCOUNTER
FUTURE VISIT INFORMATION      FUTURE VISIT INFORMATION:    Date: 1.7.2020    Time: 7:40 AM    Location: [facility]  REFERRAL INFORMATION:    Referring provider:  N/A    Referring providers clinic:  N/A    Reason for visit/diagnosis  Annual eye exam    RECORDS REQUESTED FROM:       Clinic name Comments Records Status Imaging Status   [facility] 1.5.18 Dr. Carey, OV EPIC

## 2020-01-07 ENCOUNTER — OFFICE VISIT (OUTPATIENT)
Dept: OPHTHALMOLOGY | Facility: CLINIC | Age: 25
End: 2020-01-07
Payer: COMMERCIAL

## 2020-01-07 ENCOUNTER — PRE VISIT (OUTPATIENT)
Dept: OPHTHALMOLOGY | Facility: CLINIC | Age: 25
End: 2020-01-07

## 2020-01-07 DIAGNOSIS — H53.143 COMPUTER VISION SYNDROME OF BOTH EYES: ICD-10-CM

## 2020-01-07 DIAGNOSIS — H52.13 MYOPIA OF BOTH EYES: Primary | ICD-10-CM

## 2020-01-07 ASSESSMENT — CUP TO DISC RATIO
OD_RATIO: 0.3
OS_RATIO: 0.3

## 2020-01-07 ASSESSMENT — REFRACTION_CURRENTRX
OD_DIAMETER: 14.0
OS_BASECURVE: 8.70
OS_BRAND: ALCON DAILIES AQUA COMFORT PLUS BC 8.7, D 14.0
OD_CYLINDER: SPHERE
OS_SPHERE: -1.50
OD_SPHERE: -1.50
OD_BASECURVE: 8.70
OD_BRAND: ALCON DAILIES AQUA COMFORT PLUS BC 8.7, D 14.0
OS_CYLINDER: SPHERE
OS_DIAMETER: 14.0

## 2020-01-07 ASSESSMENT — SLIT LAMP EXAM - LIDS
COMMENTS: NORMAL
COMMENTS: NORMAL

## 2020-01-07 ASSESSMENT — REFRACTION_WEARINGRX
OS_SPHERE: -1.25
OD_SPHERE: -1.50
OS_CYLINDER: SPHERE
OD_CYLINDER: SPHERE

## 2020-01-07 ASSESSMENT — REFRACTION_MANIFEST
OD_CYLINDER: SPHERE
OD_SPHERE: -1.75
OS_SPHERE: -1.75
OS_CYLINDER: SPHERE

## 2020-01-07 ASSESSMENT — VISUAL ACUITY
OD_CC: 20/20
METHOD: SNELLEN - LINEAR
OS_CC: 20/20
CORRECTION_TYPE: CONTACTS

## 2020-01-07 ASSESSMENT — CONF VISUAL FIELD
OD_NORMAL: 1
OS_NORMAL: 1
METHOD: COUNTING FINGERS

## 2020-01-07 ASSESSMENT — EXTERNAL EXAM - RIGHT EYE: OD_EXAM: NORMAL

## 2020-01-07 ASSESSMENT — TONOMETRY
OS_IOP_MMHG: 15
OD_IOP_MMHG: 18
IOP_METHOD: ICARE

## 2020-01-07 ASSESSMENT — EXTERNAL EXAM - LEFT EYE: OS_EXAM: NORMAL

## 2020-01-07 NOTE — PROGRESS NOTES
History  HPI     Annual Eye Exam     In both eyes.  This started years ago.  Charactertized as  blurred vision.  Occurring infrequently.  Since onset it is stable.  Associated symptoms include dryness.  Negative for eye pain.  Treatments tried include no treatments.  Pain was noted as 0/10.              Comments     CLs doing well. Some blurring in CLs occasionally. Might be due to dryness, will go away with blinking. Glasses working well. Patient denies eye pain or irritation. Does not use any eye drops.      Eufemia Ramesh COT 7:38 AM January 7, 2020             Last edited by Eufemia Ramesh on 1/7/2020  7:38 AM. (History)          Assessment/Plan  (H52.13) Myopia of both eyes  (primary encounter diagnosis)  Comment: Myopia both eyes, happy in contact lenses  Plan: REFRACTION, C CONTACT LENS FITTING COSMETIC LVL 1 - ADULT         Educated patient on condition and clinical findings. Dispensed spectacle prescription for full time wear. Educated patient on possibility of adaptation period, if symptoms do not improve return to clinic for further testing.   Dispensed trial lenses and finalized contact lens prescription for 2 years.    (H53.143) Computer vision syndrome  Comment: Eye strain, blurred vision at the end of the work day  Plan: Discussed 20/20/20 rule and blue blocking lenses as options. Monitor as needed.    Return to clinic in 1 year for comprehensive eye exam.    Contact Lens Billing  V-Code:  - Soft spherical  Final Contact Lens Rx       Brand Base Curve Diameter Sphere Cylinder    Right [employer] Aqua Comfort Plus BC 8.7, D 14.0 8.70 14.0 -1.75 Sphere    Left [employer] Aqua Comfort Plus BC 8.7, D 14.0 8.70 14.0 -1.75 Sphere    Expiration Date:  1/7/2022    Replacement:  Daily    Wearing Schedule:  Daily wear         CL Fitting Fee: $75    These are for cosmetic contact lenses.    Encounter Diagnosis   Name Primary?     Myopia of both eyes Yes      Complete documentation of historical and exam  elements from today's encounter can  be found in the full encounter summary report (not reduplicated in this progress  note). I personally obtained the chief complaint(s) and history of present illness. I  confirmed and edited as necessary the review of systems, past medical/surgical  history, family history, social history, and examination findings as documented by  others; and I examined the patient myself. I personally reviewed the relevant tests,  images, and reports as documented above. I formulated and edited as necessary the  assessment and plan and discussed the findings and management plan with the  patient and family.    Clark Pagan OD, FAAO

## 2020-01-07 NOTE — NURSING NOTE
Chief Complaints and History of Present Illnesses   Patient presents with     Annual Eye Exam     Chief Complaint(s) and History of Present Illness(es)     Annual Eye Exam     Laterality: both eyes    Onset: years ago    Quality: blurred vision    Frequency: infrequently    Course: stable    Associated symptoms: dryness.  Negative for eye pain    Treatments tried: no treatments    Pain scale: 0/10              Comments     CLs doing well. Some blurring in CLs occasionally. Might be due to dryness, will go away with blinking. Glasses working well. Patient denies eye pain or irritation. Does not use any eye drops.      Efuemia Ramesh COT 7:38 AM January 7, 2020

## 2020-01-12 DIAGNOSIS — L70.0 ACNE VULGARIS: ICD-10-CM

## 2020-01-13 RX ORDER — DROSPIRENONE AND ETHINYL ESTRADIOL 0.02-3(28)
KIT ORAL
Qty: 84 TABLET | Refills: 2 | Status: SHIPPED | OUTPATIENT
Start: 2020-01-13 | End: 2020-12-11

## 2020-01-13 NOTE — TELEPHONE ENCOUNTER
"Requested Prescriptions   Pending Prescriptions Disp Refills     ALICIA 3-0.02 MG tablet [Pharmacy Med Name: ALICIA 3 MG-0.02 MG TABLET] 84 tablet 0     Sig: TAKE 1 TABLET BY MOUTH EVERY DAY       Contraceptives Protocol Passed - 1/12/2020  8:26 AM        Passed - Patient is not a current smoker if age is 35 or older        Passed - Recent (12 mo) or future (30 days) visit within the authorizing provider's specialty     Patient has had an office visit with the authorizing provider or a provider within the authorizing providers department within the previous 12 mos or has a future within next 30 days. See \"Patient Info\" tab in inbasket, or \"Choose Columns\" in Meds & Orders section of the refill encounter.              Passed - Medication is active on med list        Passed - No active pregnancy on record        Passed - No positive pregnancy test in past 12 months        drospirenone-ethinyl estradiol (ALICIA) 3-0.02 MG tablet 84 tablet 0 11/27/2019       Last Written Prescription Date:  11/27/2019  Last Fill Quantity: 84,  # refills: 0   Last office visit: 11/27/2019 with prescribing provider:  Dr. Gonzalez   Future Office Visit:  Unknown     "

## 2020-03-18 ENCOUNTER — E-VISIT (OUTPATIENT)
Dept: FAMILY MEDICINE | Facility: CLINIC | Age: 25
End: 2020-03-18
Payer: COMMERCIAL

## 2020-03-18 DIAGNOSIS — F32.1 CURRENT MODERATE EPISODE OF MAJOR DEPRESSIVE DISORDER WITHOUT PRIOR EPISODE (H): ICD-10-CM

## 2020-03-18 DIAGNOSIS — F41.1 GAD (GENERALIZED ANXIETY DISORDER): Primary | ICD-10-CM

## 2020-03-18 PROCEDURE — 99207 ZZC NO BILLABLE SERVICE THIS VISIT: CPT | Performed by: INTERNAL MEDICINE

## 2020-03-18 ASSESSMENT — ANXIETY QUESTIONNAIRES
5. BEING SO RESTLESS THAT IT IS HARD TO SIT STILL: NEARLY EVERY DAY
GAD7 TOTAL SCORE: 21
2. NOT BEING ABLE TO STOP OR CONTROL WORRYING: NEARLY EVERY DAY
4. TROUBLE RELAXING: NEARLY EVERY DAY
7. FEELING AFRAID AS IF SOMETHING AWFUL MIGHT HAPPEN: NEARLY EVERY DAY
1. FEELING NERVOUS, ANXIOUS, OR ON EDGE: NEARLY EVERY DAY
GAD7 TOTAL SCORE: 21
6. BECOMING EASILY ANNOYED OR IRRITABLE: NEARLY EVERY DAY
7. FEELING AFRAID AS IF SOMETHING AWFUL MIGHT HAPPEN: NEARLY EVERY DAY
3. WORRYING TOO MUCH ABOUT DIFFERENT THINGS: NEARLY EVERY DAY
GAD7 TOTAL SCORE: 21

## 2020-03-18 ASSESSMENT — PATIENT HEALTH QUESTIONNAIRE - PHQ9
SUM OF ALL RESPONSES TO PHQ QUESTIONS 1-9: 12
SUM OF ALL RESPONSES TO PHQ QUESTIONS 1-9: 12
10. IF YOU CHECKED OFF ANY PROBLEMS, HOW DIFFICULT HAVE THESE PROBLEMS MADE IT FOR YOU TO DO YOUR WORK, TAKE CARE OF THINGS AT HOME, OR GET ALONG WITH OTHER PEOPLE: VERY DIFFICULT

## 2020-03-19 ASSESSMENT — PATIENT HEALTH QUESTIONNAIRE - PHQ9: SUM OF ALL RESPONSES TO PHQ QUESTIONS 1-9: 12

## 2020-03-19 ASSESSMENT — ANXIETY QUESTIONNAIRES: GAD7 TOTAL SCORE: 21

## 2020-03-19 NOTE — TELEPHONE ENCOUNTER
Patient did not respond to E-visit. Closing encounter as it has been over 30 days since being initiated.

## 2020-04-23 DIAGNOSIS — F32.4 MAJOR DEPRESSIVE DISORDER WITH SINGLE EPISODE, IN PARTIAL REMISSION (H): ICD-10-CM

## 2020-04-23 RX ORDER — CITALOPRAM HYDROBROMIDE 20 MG/1
TABLET ORAL
Qty: 30 TABLET | Refills: 5 | Status: SHIPPED | OUTPATIENT
Start: 2020-04-23 | End: 2020-12-03

## 2020-04-23 NOTE — TELEPHONE ENCOUNTER
Filled per [facility] protocol. Pharmacy change    CHEYANNE RodríguezN, RN  [employer] Triage

## 2020-05-08 ENCOUNTER — VIRTUAL VISIT (OUTPATIENT)
Dept: FAMILY MEDICINE | Facility: OTHER | Age: 25
End: 2020-05-08

## 2020-05-08 NOTE — PROGRESS NOTES
"Date: 2020 12:33:51  Clinician: Barbara Beaver  Clinician NPI: [id number]  Patient: Farrah Bowen  Patient : 1995  Patient Address: [address] [place] [address]  Patient Phone: [phone]  Visit Protocol: URI  Patient Summary:  Farrah is a 24 year old ( : 1995 ) female who initiated a Visit for COVID-19 (Coronavirus) evaluation and screening. When asked the question \"Please sign me up to receive news, health information and promotions from [employer].\", Farrah responded \"No\".    Farrah states her symptoms started 1-2 days ago.   Her symptoms consist of myalgia, facial pain or pressure, nausea, diarrhea, ear pain, malaise, enlarged lymph nodes, and chills. Farrah also feels feverish.   Symptom details     Temperature: Her current temperature is 100.1 degrees Fahrenheit. Farrah has had a temperature over 100 degrees Fahrenheit for 1-2 days.     Facial pain or pressure: The facial pain or pressure does not feel worse when bending or leaning forward.      Farrah denies having rhinitis, sore throat, cough, nasal congestion, vomiting, teeth pain, ageusia, anosmia, headache, and wheezing. She also denies taking antibiotic medication for the symptoms, having a sinus infection within the past year, and having recent facial or sinus surgery in the past 60 days. She is not experiencing dyspnea.   Precipitating events  She has not recently been exposed to someone with influenza. Farrah has not been in close contact with any high risk individuals.   Pertinent COVID-19 (Coronavirus) information  Farrah does not work or volunteer as healthcare worker or a  and does not work or volunteer in a healthcare facility.   She lives with a healthcare worker.   Farrah has not had a close contact with a laboratory-confirmed COVID-19 patient within 14 days of symptom onset. She also has not had a close contact with a suspected COVID-19 patient within 14 days of symptom onset.   Triage Point(s) temporarily " suspended for COVID-19 (Coronavirus) screening  Farrah reported the following symptoms which were previously protocol referral points. These protocol referral points have temporarily been removed for purposes of COVID-19 (Coronavirus) screening.   Meets at least 3/5 centor score criteria     Swollen lymph nodes    Temp over 100    Absence of cough         Pertinent medical history  Farrah does not get yeast infections when she takes antibiotics.   Farrah needs a return to work/school note.   Weight: 190 lbs   Farrah does not smoke or use smokeless tobacco.   She denies pregnancy and denies breastfeeding. She has menstruated in the past month.   Additional information as reported by the patient (free text): My parents are both high risk, essential workers   Weight: 190 lbs    MEDICATIONS: propranolol-hydrochlorothiazide oral, Fannie (28) oral, citalopram oral, ALLERGIES: amoxicillin, codeine  Clinician Response:  Dear Farrah,     Your symptoms show that you may have coronavirus (COVID-19). This illness can cause fever, cough and trouble breathing. Many people get a mild case and get better on their own. Some people can get very sick.   Will I be tested for COVID-19?  Because of your fever and you live with a healthcare worker, we would recommend you be tested for coronavirus. Here is how to get that scheduled:   Call [phone]. Tell them you were referred by [facility] to have a COVID-19 test. You will be scheduled at one of our [facility] testing locations (drive-up). Please have your [facility] visit information ready when you call including your visit ID number to verify you were referred.    How can I protect others in the meantime?  First, stay home and away from others (self-isolate) until:   You've had no fever---and no medicine that reduces fever---for 3 full days (72 hours). And...    Your other symptoms have gotten better. For example, your cough or breathing has improved. And...    At least 10 days have passed since your  symptoms started.   During this time:   Don't go to work, school or anywhere else.     Stay away from others in your home. No hugging, kissing or shaking hands.    Don't let anyone visit.    Cover your mouth and nose with a mask, tissue or wash cloth to avoid spreading germs.    Wash your hands and face often. Use soap and water.   How can I take care of myself?  1.Take Tylenol (acetaminophen) for fever or pain. If you have liver or kidney problems, ask your family doctor if it's okay to take Tylenol.   Adults can take either:    650 mg (two 325 mg pills) every 4 to 6 hours, or...    1,000 mg (two 500 mg pills) every 8 hours as needed.     Note: Don't take more than 3,000 mg in one day.   For children, check the Tylenol bottle for the right dose. The dose is based on the child's age or weight.  2.If you have other health problems (like cancer, heart failure, an organ transplant or severe kidney disease): Call your specialty clinic if you don't feel better in the next 2 days.  3.Know when to call 911: If your breathing is so bad that it keeps you from doing normal activities, call 911 or go to the emergency room. Tell them that you've been staying home and may have COVID-19.  4.Sign up for [employer]. We know it's scary to hear that you might have COVID-19. We want to track your symptoms to make sure you're okay over the next 2 weeks. Please look for an email from [employer]---this is a free, online program that we'll use to keep in touch. To sign up, follow the link in the email. Learn more at [email or web address].  Where can I get more information?  To learn more about COVID-19 and how to care for yourself at home, please visit the CDC website at https://www.cdc.gov/coronavirus/2019-ncov/about/steps-when-sick.html.  For more about your care at [facility], please visit [email or web address]     Diagnosis: Acute maxillary sinusitis, unspecified  Diagnosis ICD: J01.00

## 2020-05-09 ENCOUNTER — OFFICE VISIT (OUTPATIENT)
Dept: URGENT CARE | Facility: URGENT CARE | Age: 25
End: 2020-05-09
Payer: COMMERCIAL

## 2020-05-09 DIAGNOSIS — Z20.822 SUSPECTED COVID-19 VIRUS INFECTION: Primary | ICD-10-CM

## 2020-05-09 LAB
SARS-COV-2 RNA SPEC QL NAA+PROBE: NOT DETECTED
SPECIMEN SOURCE: NORMAL

## 2020-05-09 PROCEDURE — 87635 SARS-COV-2 COVID-19 AMP PRB: CPT | Mod: 90 | Performed by: FAMILY MEDICINE

## 2020-05-09 PROCEDURE — 99207 ZZC NO BILLABLE SERVICE THIS VISIT: CPT

## 2020-05-09 PROCEDURE — 99000 SPECIMEN HANDLING OFFICE-LAB: CPT | Performed by: FAMILY MEDICINE

## 2020-07-14 ENCOUNTER — MYC MEDICAL ADVICE (OUTPATIENT)
Dept: FAMILY MEDICINE | Facility: CLINIC | Age: 25
End: 2020-07-14

## 2020-07-14 DIAGNOSIS — M25.552 HIP PAIN, LEFT: Primary | ICD-10-CM

## 2020-07-15 NOTE — TELEPHONE ENCOUNTER
See MyChart response, it is patients left hip.   Laine Quintana RN   [facility] - Triage

## 2020-07-17 ENCOUNTER — THERAPY VISIT (OUTPATIENT)
Dept: PHYSICAL THERAPY | Facility: CLINIC | Age: 25
End: 2020-07-17
Payer: COMMERCIAL

## 2020-07-17 DIAGNOSIS — M25.552 HIP PAIN, LEFT: ICD-10-CM

## 2020-07-17 PROCEDURE — 97110 THERAPEUTIC EXERCISES: CPT | Mod: GP | Performed by: PHYSICAL THERAPIST

## 2020-07-17 PROCEDURE — 97161 PT EVAL LOW COMPLEX 20 MIN: CPT | Mod: GP | Performed by: PHYSICAL THERAPIST

## 2020-07-17 NOTE — PROGRESS NOTES
"[facility] Initial Evaluation  Subjective:  The history is provided by the patient. No  was used.   Therapist Generated HPI Evaluation  Problem details: The past month has noticed she is unable to jump equally up or down to/from a step or object. L hip \"cracks and is painful\". MD dhillon 7/14/20.         Type of problem:  Left hip.    This is a new condition.  Condition occurred with:  Insidious onset.    Patient reports pain:  Anterior.    Pain radiates to:  No radiation.     Associated symptoms:  Catching (poping). Symptoms are exacerbated by activity  and relieved by rest.          Patient Health History  Farrah Bowen being seen for L hip.     Date of Onset: past month noticed worse.   Problem occurred: insidious (noticed while doing plyometrics)   Pain is reported as 2/10 on pain scale.  General health as reported by patient is excellent.  Pertinent medical history includes: none.   Red flags:  None as reported by patient.  Medical allergies: none.   Surgeries include:  None.    Current medications:  None.    Current occupation is Health care consultant.   Primary job tasks include:  Computer work and prolonged sitting.                                    Objective:    Gait:    Gait Type:  Normal   Assistive Devices:  None      Flexibility/Screens:       Lower Extremity:  Decreased left lower extremity flexibility:Hip Flexors; IT Band and Quadriceps    Decreased right lower extremity flexibility:  Hip Flexors; IT Band and Quadriceps                                                 Hip Evaluation  HIP AROM:  AROM:    Left Hip:     Normal    Right Hip:   Normal                  Hip PROM:  Hip PROM:  Left Hip:    Normal  Right Hip:  Normal                          Hip Strength:  Hip Strength:    Left:    Normal  Right:  Normal                          Hip Special Testing:    Left hip positive for the following special tests:  Fadir/Labrum; Oriana's and Eddie  Left hip negative " for the following special tests:  Piriformis; Deneen or SLR   Right hip positive for the following special tests:  Oriana's and ThomasRight hip negative for the following special tests:  Piriformis; Deneen; Fadir/Labrum or SLR    Hip Palpation:  Not Assessed         Functional Testing:        Core Strength:      Single leg bridge:    Left: 10/20 reps  Right: 20/20 reps  % of Uninvolved:  50    Quad:    Single leg squat:   Left:    Significant loss of control  Right:   Significant loss of control                     General     ROS    Assessment/Plan:    Patient is a 24 year old female with left side hip complaints.    Patient has the following significant findings with corresponding treatment plan.                Diagnosis 1:  L hip  Pain -  self management, education and home program  Decreased strength - therapeutic exercise and therapeutic activities  Decreased function - therapeutic activities  Instability -  Therapeutic Activity  Therapeutic Exercise    Therapy Evaluation Codes:   1) History comprised of:   Personal factors that impact the plan of care:      None.    Comorbidity factors that impact the plan of care are:      None.     Medications impacting care: None.  2) Examination of Body Systems comprised of:   Body structures and functions that impact the plan of care:      Hip.   Activity limitations that impact the plan of care are:      Sports.  3) Clinical presentation characteristics are:   Stable/Uncomplicated.  4) Decision-Making    Low complexity using standardized patient assessment instrument and/or measureable assessment of functional outcome.  Cumulative Therapy Evaluation is: Low complexity.    Previous and current functional limitations:  (See Goal Flow Sheet for this information)    Short term and Long term goals: (See Goal Flow Sheet for this information)     Communication ability:  Patient appears to be able to clearly communicate and understand verbal and written communication and follow  directions correctly.  Treatment Explanation - The following has been discussed with the patient:   RX ordered/plan of care  Anticipated outcomes  Possible risks and side effects  This patient would benefit from PT intervention to resume normal activities.   Rehab potential is excellent.    Frequency:  2 X a month, once daily  Duration:  for 2 months  Discharge Plan:  Achieve all LTG.  Independent in home treatment program.  Reach maximal therapeutic benefit.    Please refer to the daily flowsheet for treatment today, total treatment time and time spent performing 1:1 timed codes.

## 2020-07-31 ENCOUNTER — THERAPY VISIT (OUTPATIENT)
Dept: PHYSICAL THERAPY | Facility: CLINIC | Age: 25
End: 2020-07-31
Payer: COMMERCIAL

## 2020-07-31 DIAGNOSIS — M25.552 HIP PAIN, LEFT: ICD-10-CM

## 2020-07-31 PROCEDURE — 97110 THERAPEUTIC EXERCISES: CPT | Mod: GP | Performed by: PHYSICAL THERAPIST

## 2020-07-31 PROCEDURE — 97112 NEUROMUSCULAR REEDUCATION: CPT | Mod: GP | Performed by: PHYSICAL THERAPIST

## 2020-10-08 NOTE — PROGRESS NOTES
DISCHARGE SUMMARY    Farrah Bowen was seen   times for evaluation and treatment.  Patient did not return for further treatment and current status is unknown.  Due to short treatment duration, no objective or functional changes were made.It is likely she will come back in the fall when visiting from [place]  Please see goal flow sheet from episode noted date below and initial evaluation for further information.  Patient is discharged from therapy and therapy episode is resolved as of 10/08/20.      Linked Episodes   Type: Episode: Status: Noted: Resolved: Last update: Updated by:   PHYSICAL THERAPY L [facility] [id number] Active 7/17/2020 7/31/2020 11:51 AM Abdirahman Pt, Bravo PT      Comments:

## 2020-10-28 ENCOUNTER — TELEPHONE (OUTPATIENT)
Dept: FAMILY MEDICINE | Facility: CLINIC | Age: 25
End: 2020-10-28

## 2020-10-28 NOTE — LETTER
November 5, 2020      Farrah Bowen  [address]  [place] [address]        Dear Farrah,    I care about your health and have reviewed your health plan. I have reviewed your medical conditions, medication list, and lab results and am making recommendations based on this review, to better manage your health.    You are in particular need of attention regarding:  -Depression    I am recommending that you:  -Please complete the enclosed PHQ9 and return it in the envelope provided    Here is a list of Health Maintenance topics that are due now or due soon:  Health Maintenance Due   Topic Date Due     Depression Action Plan  1995     Hepatitis C Screening  09/25/2014     Depression Assessment  09/18/2020     Preventive Care Visit  11/27/2020     Chlamydia Screening  11/27/2020       Please call us at [phone] (or use [employer]) to address the above recommendations.     Thank you for trusting [facility] and we appreciate the opportunity to serve you.  We look forward to supporting your healthcare needs in the future.    Healthy Regards,    Cecelia Gonzalez MD

## 2020-10-28 NOTE — TELEPHONE ENCOUNTER
Pt is due now to update PHQ9.  noah message sent to pt. Follow up end date 11/17/20.   PHQ-9 SCORE 12/24/2018 11/27/2019 3/18/2020   PHQ-9 Total Score Noah - - 12 (Moderate depression)   PHQ-9 Total Score 3 0 12     Abner MILES CMA

## 2020-11-03 NOTE — TELEPHONE ENCOUNTER
Non-detailed message left for patient to return call or complete questionnaire sent on my chart.  CHEYANNE RodríguezN, RN  [employer] Triage

## 2020-11-04 NOTE — TELEPHONE ENCOUNTER
2nd attempt.  Non detailed message left for pt to return call to clinic and ask to speak with a triage nurse.    Thi NAVARRO RN  EP Triage

## 2020-11-22 NOTE — NURSING NOTE
"Chief Complaint   Patient presents with     Recheck Medication       Initial /60 (BP Location: Left arm, Patient Position: Chair, Cuff Size: Adult Regular)  Pulse 101  Temp 98.6  F (37  C) (Tympanic)  Wt 181 lb (82.1 kg)  LMP 12/30/2017  BMI 25.24 kg/m2 Estimated body mass index is 25.24 kg/(m^2) as calculated from the following:    Height as of 12/15/17: 5' 11\" (1.803 m).    Weight as of this encounter: 181 lb (82.1 kg).  Medication Reconciliation: complete  "
weight-bearing as tolerated/RLE

## 2020-11-27 ENCOUNTER — THERAPY VISIT (OUTPATIENT)
Dept: PHYSICAL THERAPY | Facility: CLINIC | Age: 25
End: 2020-11-27
Payer: COMMERCIAL

## 2020-11-27 DIAGNOSIS — M25.552 HIP PAIN, LEFT: ICD-10-CM

## 2020-11-27 PROCEDURE — 97112 NEUROMUSCULAR REEDUCATION: CPT | Mod: GP | Performed by: PHYSICAL THERAPIST

## 2020-11-27 PROCEDURE — 97110 THERAPEUTIC EXERCISES: CPT | Mod: GP | Performed by: PHYSICAL THERAPIST

## 2020-11-27 NOTE — PROGRESS NOTES
Subjective:  HPI  Physical Exam                    Objective:  System    Physical Exam    General     ROS    Assessment/Plan:    DISCHARGE REPORT    Progress reporting period is from 7/17/20 to 11/27/20.       SUBJECTIVE  Subjective changes noted by patient:  Is doing spin class 3 or more times per week.       Current pain level is 1/10  .     Previous pain level was  4/10  .   Changes in function:  Yes (See Goal flowsheet attached for changes in current functional level)  Adverse reaction to treatment or activity: None    OBJECTIVE  Changes noted in objective findings:  Yes, ARIOM and PROM WNL, but slight + impingement R hip with flexion   Independent HEP        ASSESSMENT/PLAN  Updated problem list and treatment plan: Diagnosis 1:  Hip pain  Decreased strength - therapeutic exercise, therapeutic activities and home program  Decreased function - therapeutic activities  STG/LTGs have been met or progress has been made towards goals:  Yes (See Goal flow sheet completed today.)  Assessment of Progress: The patient's condition is improving.  Self Management Plans:  Patient is independent in a home treatment program.    Farrah continues to require the following intervention to meet STG and LTG's:  PT intervention is no longer required to meet STG/LTG.    Recommendations:  This patient would benefit from continued therapy.     Frequency:    Duration:        This patient is ready to be discharged from therapy and continue their home treatment program.    Please refer to the daily flowsheet for treatment today, total treatment time and time spent performing 1:1 timed codes.

## 2020-12-03 ENCOUNTER — VIRTUAL VISIT (OUTPATIENT)
Dept: FAMILY MEDICINE | Facility: CLINIC | Age: 25
End: 2020-12-03
Payer: COMMERCIAL

## 2020-12-03 DIAGNOSIS — F32.1 CURRENT MODERATE EPISODE OF MAJOR DEPRESSIVE DISORDER WITHOUT PRIOR EPISODE (H): Primary | ICD-10-CM

## 2020-12-03 DIAGNOSIS — F41.1 GAD (GENERALIZED ANXIETY DISORDER): ICD-10-CM

## 2020-12-03 PROCEDURE — 99214 OFFICE O/P EST MOD 30 MIN: CPT | Mod: GT | Performed by: INTERNAL MEDICINE

## 2020-12-03 RX ORDER — PROPRANOLOL HYDROCHLORIDE 20 MG/1
20 TABLET ORAL 3 TIMES DAILY PRN
Qty: 180 TABLET | Refills: 1 | Status: SHIPPED | OUTPATIENT
Start: 2020-12-03

## 2020-12-03 RX ORDER — CITALOPRAM HYDROBROMIDE 20 MG/1
20 TABLET ORAL DAILY
Qty: 90 TABLET | Refills: 3 | Status: SHIPPED | OUTPATIENT
Start: 2020-12-03

## 2020-12-03 RX ORDER — BUPROPION HYDROCHLORIDE 150 MG/1
150 TABLET ORAL EVERY MORNING
Qty: 90 TABLET | Refills: 0 | Status: SHIPPED | OUTPATIENT
Start: 2020-12-03 | End: 2021-09-20

## 2020-12-03 RX ORDER — HYDROXYZINE HYDROCHLORIDE 25 MG/1
25-50 TABLET, FILM COATED ORAL EVERY 6 HOURS PRN
Qty: 90 TABLET | Refills: 3 | Status: SHIPPED | OUTPATIENT
Start: 2020-12-03

## 2020-12-03 ASSESSMENT — ANXIETY QUESTIONNAIRES
7. FEELING AFRAID AS IF SOMETHING AWFUL MIGHT HAPPEN: MORE THAN HALF THE DAYS
3. WORRYING TOO MUCH ABOUT DIFFERENT THINGS: MORE THAN HALF THE DAYS
6. BECOMING EASILY ANNOYED OR IRRITABLE: NOT AT ALL
IF YOU CHECKED OFF ANY PROBLEMS ON THIS QUESTIONNAIRE, HOW DIFFICULT HAVE THESE PROBLEMS MADE IT FOR YOU TO DO YOUR WORK, TAKE CARE OF THINGS AT HOME, OR GET ALONG WITH OTHER PEOPLE: VERY DIFFICULT
GAD7 TOTAL SCORE: 10
5. BEING SO RESTLESS THAT IT IS HARD TO SIT STILL: NOT AT ALL
1. FEELING NERVOUS, ANXIOUS, OR ON EDGE: MORE THAN HALF THE DAYS
2. NOT BEING ABLE TO STOP OR CONTROL WORRYING: MORE THAN HALF THE DAYS

## 2020-12-03 ASSESSMENT — PATIENT HEALTH QUESTIONNAIRE - PHQ9
SUM OF ALL RESPONSES TO PHQ QUESTIONS 1-9: 9
5. POOR APPETITE OR OVEREATING: MORE THAN HALF THE DAYS

## 2020-12-03 NOTE — PROGRESS NOTES
"Farrah Bowen is a 25 year old female who is being evaluated via a billable video visit.      The patient has been notified of following:     \"This video visit will be conducted via a call between you and your physician/provider. We have found that certain health care needs can be provided without the need for an in-person physical exam.  This service lets us provide the care you need with a video conversation.  If a prescription is necessary we can send it directly to your pharmacy.  If lab work is needed we can place an order for that and you can then stop by our lab to have the test done at a later time.    Video visits are billed at different rates depending on your insurance coverage.  Please reach out to your insurance provider with any questions.    If during the course of the call the physician/provider feels a video visit is not appropriate, you will not be charged for this service.\"    Patient has given verbal consent for Video visit? Yes  How would you like to obtain your AVS? MyChart  If you are dropped from the video visit, the video invite should be resent to: Text to cell phone: [phone]  Will anyone else be joining your video visit? No      Subjective     Farrah Bowen is a 25 year old female who presents today via video visit for the following health issues:    HPI     Depression and Anxiety Follow-Up    How are you doing with your depression since your last visit? Worsened pandemic, effecting work, hasn't felt this bad since she was diagnosed.    How are you doing with your anxiety since your last visit?  Worsened same as a dove.    Are you having other symptoms that might be associated with depression or anxiety? Yes:  hard to get out of bed, self-esteem issues, poor work performance, both parents are essential workers and 65.    Have you had a significant life event? Job Concerns     Do you have any concerns with your use of alcohol or other drugs? Yes:  when she drinks depression gets " worse    Anxiety really took hole of her in the beginning of the pandemic. Both of her parents are essential workers and are over age 65 so this really worried her. 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 She moved home to be with her parents over the summer so she could make sure they were OK. End of summer she moved back to [facility] and while not being able to see her parents regularly she found that her depressive symptoms worsened quite a bit. She hasn't felt depressed in a long time so this is really new for her. She wants to sleep all of the time. She has developed a lot of negative thinking.     Currently taking Citalopram 20 mg daily, Propranolol 20 mg one to two times daily, and Hydroxyzine at bedtime.     Social History     Tobacco Use     Smoking status: Never Smoker     Smokeless tobacco: Never Used   Substance Use Topics     Alcohol use: Yes     Alcohol/week: 0.0 standard drinks     Comment: 3/ month     Drug use: No     PHQ 11/27/2019 3/18/2020 12/3/2020   PHQ-9 Total Score 0 12 9   Q9: Thoughts of better off dead/self-harm past 2 weeks Not at all Not at all Not at all     TJ-7 SCORE 11/27/2019 3/18/2020 12/3/2020   Total Score - 21 (severe anxiety) -   Total Score 5 21 10     Last PHQ-9 12/3/2020   1.  Little interest or pleasure in doing things 1   2.  Feeling down, depressed, or hopeless 1   3.  Trouble falling or staying asleep, or sleeping too much 2   4.  Feeling tired or having little energy 1   5.  Poor appetite or overeating 0   6.  Feeling bad about yourself 3   7.  Trouble concentrating 1   8.  Moving slowly or restless 0   Q9: Thoughts of better off dead/self-harm past 2 weeks 0   PHQ-9 Total Score 9   Difficulty at work, home, or with people Very difficult     TJ-7  12/3/2020   1. Feeling nervous, anxious, or on edge 2   2. Not being able to stop or control worrying 2   3. Worrying too much about different things 2   4.  Trouble relaxing 2   5. Being so restless that it is hard to sit still 0   6. Becoming easily annoyed or irritable 0   7. Feeling afraid, as if something awful might happen 2   TJ-7 Total Score 10   If you checked any problems, how difficult have they made it for you to do your work, take care of things at home, or get along with other people? Very difficult       Suicide Assessment Five-step Evaluation and Treatment (SAFE-T)               Video Start Time: 7:04      Review of Systems   Constitutional, HEENT, cardiovascular, pulmonary, GI, , musculoskeletal, neuro, skin, endocrine and psych systems are negative, except as otherwise noted.      Objective           Vitals:  No vitals were obtained today due to virtual visit.    Physical Exam     GENERAL: Healthy, alert and no distress  EYES: Eyes grossly normal to inspection.  No discharge or erythema, or obvious scleral/conjunctival abnormalities.  RESP: No audible wheeze, cough, or visible cyanosis.  No visible retractions or increased work of breathing.    SKIN: Visible skin clear. No significant rash, abnormal pigmentation or lesions.  NEURO: Cranial nerves grossly intact.  Mentation and speech appropriate for age.  PSYCH: Mentation appears normal, affect normal/bright, judgement and insight intact, normal speech and appearance well-groomed.            Assessment & Plan     Current moderate episode of major depressive disorder without prior episode (H)  Acutely worsening depressive symptoms in the setting of COVID, being away from family, and a stressful job. Denies suicidal thoughts but does have a lot of negative thinking about her self worth and ability to contribute. She is on Citalopram 20 mg and this has worked well for her up until recently; recommending she add Wellbutrin 150 mg once daily as well as begin counseling. I will refer her.   - buPROPion (WELLBUTRIN XL) 150 MG 24 hr tablet; Take 1 tablet (150 mg) by mouth every morning  - MENTAL HEALTH  REFERRAL  - Adult; Outpatient Treatment; Individual/Couples/Family/Group Therapy/Health Psychology; [facility] [phone]; We will contact you to schedule the appointment or please call with any questions    TJ (generalized anxiety disorder)  - hydrOXYzine (ATARAX) 25 MG tablet; Take 1-2 tablets (25-50 mg) by mouth every 6 hours as needed for anxiety  - propranolol (INDERAL) 20 MG tablet; Take 1 tablet (20 mg) by mouth 3 times daily as needed  - MENTAL HEALTH REFERRAL  - Adult; Outpatient Treatment; Individual/Couples/Family/Group Therapy/Health Psychology; [facility] [phone]; We will contact you to schedule the appointment or please call with any questions        Return in about 3 months (around 3/3/2021) for Physical Exam, Depression/Anxiety Check, In Person Visit.    Cecelia Gonzalez MD  [facility]      Video-Visit Details    Type of service:  Video Visit    Video End Time:7:31 AM    Originating Location (pt. Location): Home    Distant Location (provider location):  [facility]     Platform used for Video Visit: [employer]

## 2020-12-04 ASSESSMENT — ANXIETY QUESTIONNAIRES: GAD7 TOTAL SCORE: 10

## 2021-01-15 ENCOUNTER — HEALTH MAINTENANCE LETTER (OUTPATIENT)
Age: 26
End: 2021-01-15

## 2021-04-14 ENCOUNTER — TELEPHONE (OUTPATIENT)
Dept: FAMILY MEDICINE | Facility: CLINIC | Age: 26
End: 2021-04-14

## 2021-04-14 NOTE — TELEPHONE ENCOUNTER
Patient Quality Outreach      Summary:    Patient has the following on her problem list/HM:   Depression / Dysthymia review    12 Month Remission: 10-14 month window range: 5/17/2021       PHQ-9 SCORE 11/27/2019 3/18/2020 12/3/2020   PHQ-9 Total Score MyChart - 12 (Moderate depression) -   PHQ-9 Total Score 0 12 9       If PHQ-9 recheck is 5 or more, route to provider for next steps.    Patient is due/failing the following:   PHQ-9 Needed    Type of outreach:    Sent [employer] message.    Questions for provider review:    None                                                                                     Abner MILES, JUAN

## 2021-04-16 NOTE — TELEPHONE ENCOUNTER
3rd attempt.  Non detailed message left for pt to return call to clinic and ask to speak with a triage nurse.    Mailed PHQ 9 and TJ 7 to home address.    Thi NAVARRO RN  EP Triage

## 2021-09-15 ENCOUNTER — TELEPHONE (OUTPATIENT)
Dept: OPHTHALMOLOGY | Facility: CLINIC | Age: 26
End: 2021-09-15

## 2021-09-15 NOTE — TELEPHONE ENCOUNTER
Spoke to father at 1700    Pt in [facility] and diagnosed with ruptured stye/cellulitis right lower lid    Pt arriving in [place] Friday night and requesting f/u on Monday    Pt on erythromycin ointment and oral antibiotics.    Scheduled Monday at 0730 with Dr. Leonardo at [facility] location    Father/patient aware of date/time/location at [facility] and provided [phone] option 4 for information to page on call provider over weekend for any worsening symptoms/concerns    José Zamudio RN 5:16 PM 09/15/21          [facility]    Phone Message    May a detailed message be left on voicemail: yes     Reason for Call: Symptoms or Concerns     If patient has red-flag symptoms, warm transfer to triage line    Current symptom or concern: Rt eye lower lid stye and preceptal cellulitis. Pt is flying in to town on Friday, looking to be seen Monday.    Symptoms have been present for:  2 day(s)    Has patient previously been seen for this? No    By : Dr. Pagan    Date: 1/7/2020    Are there any new or worsening symptoms? No      Action Taken: Message routed to:  [facility] ([facility] EYE    Travel Screening: Not Applicable

## 2021-09-20 ENCOUNTER — OFFICE VISIT (OUTPATIENT)
Dept: OPHTHALMOLOGY | Facility: CLINIC | Age: 26
End: 2021-09-20
Attending: OPHTHALMOLOGY
Payer: COMMERCIAL

## 2021-09-20 DIAGNOSIS — H00.012 HORDEOLUM EXTERNUM OF RIGHT LOWER EYELID: Primary | ICD-10-CM

## 2021-09-20 DIAGNOSIS — L03.213 PRESEPTAL CELLULITIS OF RIGHT LOWER EYELID: ICD-10-CM

## 2021-09-20 PROCEDURE — G0463 HOSPITAL OUTPT CLINIC VISIT: HCPCS

## 2021-09-20 PROCEDURE — 99203 OFFICE O/P NEW LOW 30 MIN: CPT | Performed by: OPHTHALMOLOGY

## 2021-09-20 RX ORDER — CLINDAMYCIN HCL 300 MG
300 CAPSULE ORAL 3 TIMES DAILY
COMMUNITY

## 2021-09-20 RX ORDER — ERYTHROMYCIN 5 MG/G
0.5 OINTMENT OPHTHALMIC 4 TIMES DAILY
COMMUNITY
End: 2021-09-20

## 2021-09-20 ASSESSMENT — TONOMETRY
OS_IOP_MMHG: 16
OD_IOP_MMHG: 12
IOP_METHOD: ICARE

## 2021-09-20 ASSESSMENT — CUP TO DISC RATIO
OD_RATIO: 0.3
OS_RATIO: 0.3

## 2021-09-20 ASSESSMENT — REFRACTION_WEARINGRX
SPECS_TYPE: SVL DIST
OS_SPHERE: -2.00
OD_SPHERE: -2.00
OD_CYLINDER: SPHERE
OS_CYLINDER: SPHERE

## 2021-09-20 ASSESSMENT — CONF VISUAL FIELD
METHOD: COUNTING FINGERS
OS_NORMAL: 1
OD_NORMAL: 1

## 2021-09-20 ASSESSMENT — SLIT LAMP EXAM - LIDS: COMMENTS: MILD MGD

## 2021-09-20 ASSESSMENT — VISUAL ACUITY
OD_CC: 20/20
OS_CC: 20/20
METHOD: SNELLEN - LINEAR
CORRECTION_TYPE: GLASSES

## 2021-09-20 ASSESSMENT — EXTERNAL EXAM - RIGHT EYE: OD_EXAM: NORMAL

## 2021-09-20 ASSESSMENT — EXTERNAL EXAM - LEFT EYE: OS_EXAM: NORMAL

## 2021-09-20 NOTE — PROGRESS NOTES
History  HPI     Stye / Hordeolum Evaluation     This started 11 days ago.  Since onset it is gradually improving.              Comments     Sept 9th, irritation of RE as of FB sensation. Currently just mild irritation. That following Saturday, surrounding tissue red, inflamed and swollen. Then last Tuesday started to 'weep fluid' as if thick tears. Went to  last Wednesday in [facility]. Seen by NP, diagnosed with preseptal cellulitis.  Put on oral Clindamycin last Wednesday at  visit, along with EES ointment. Last Friday. Pt suffered considerable facial swelling, fully resolved same day after taking Benadryl. Wonders if EES isabel as had never used it before. Pt has severe amoxicillin reactions, so Clindamycin has been past antibiotic of choice. Wears SCLDW.   Four weeks ago had sinus infection lasting about 10 days.   Now feels 'bump may be gone', less redness, no tearing. Just mild irritation remains.  Pt states NP had told pt to see eye doctor even though signs/symptoms have almost fully cleared up.  TRUPTI Manzo COT 7:51 AM 09/20/2021              Last edited by Juliet Su COT on 9/20/2021  7:52 AM. (History)        HPI:  Farrah Bowen is a 25 year old female here for follow-up of RLL hordeolum with preseptal cellulitis, treated by urgent care in [facility] with po clindamycin and topical erythromycin. She had right facial swelling after starting the medications, she feels likely associated with the erythromycin. The right eyelid redness and swelling have now resolved. No changes in vision. No pain, redness, discharge.    She is a daily disposable CL wearer, but has not been wearing lenses since this began.    Assessment & Plan     (H00.012) Hordeolum externum of right lower eyelid  (primary encounter diagnosis)  (L03.213) Preseptal cellulitis of right lower eyelid  Comment: Now resolved.  Plan: Complete course of clindamycin. Avoid erythromycin ointment. Discussed that if she has  recurrent hordeola/chalazia, can do warm compresses or medications for prevention. But at this time, with single episode, would monitor.     -----------------------------------------------------------------------------------    Patient disposition:   Return for complete exam with Dr. Pagan in January 2022, or sooner as needed.    Teaching statement:  Complete documentation of historical and exam elements from today's encounter can be found in the full encounter summary report (not reduplicated in this progress note). I personally obtained the chief complaint(s) and history of present illness.  I confirmed and edited as necessary the review of systems, past medical/surgical history, family history, social history, and examination findings as documented by others; and I examined the patient myself. I personally reviewed the relevant tests, images, and reports as documented above.     I formulated and edited as necessary the assessment and plan and discussed the findings and management plan with the patient and family.      Kanwal Leonardo MD  Comprehensive Ophthalmology & Ocular Pathology  Department of Ophthalmology and Visual Neurosciences  [email or web address]  Pager [phone]

## 2021-09-20 NOTE — NURSING NOTE
Chief Complaints and History of Present Illnesses   Patient presents with     Stye / Hordeolum Evaluation     Chief Complaint(s) and History of Present Illness(es)     Stye / Hordeolum Evaluation     Onset: 11 days ago    Course: gradually improving              Comments     Sept 9th, irritation of RE as of FB sensation. Currently just mild irritation. That following Saturday, surrounding tissue red, inflamed and swollen. Then last Tuesday started to 'weep fluid' as if thick tears. [facility], diagnosed with preseptal cellulitis.  Put on oral Clindamycin last Wednesday at  visit, along with EES ointment. Last Friday. Pt suffered considerable facial swelling, fully resolved same day after taking Benadryl. Wonders if EES isabel as had never used it before. Pt has severe amoxicillin reactions, so Clindamycin has been past antibiotic of choice. Wears SCLDW.   Four weeks ago had sinus infection lasting about 10 days.   Now feels 'bump may be gone', less redness, no tearing. Just mild irritation remains.  Pt states NP had told pt to see eye doctor even though signs/symptoms have almost fully cleared up.  Juliet Su, COT COT 7:51 AM 09/20/2021

## 2021-10-02 ENCOUNTER — HEALTH MAINTENANCE LETTER (OUTPATIENT)
Age: 26
End: 2021-10-02

## 2021-11-01 DIAGNOSIS — L70.0 ACNE VULGARIS: ICD-10-CM

## 2021-11-02 RX ORDER — DROSPIRENONE AND ETHINYL ESTRADIOL 0.02-3(28)
KIT ORAL
Qty: 84 TABLET | Refills: 2 | OUTPATIENT
Start: 2021-11-02

## 2022-01-22 ENCOUNTER — HEALTH MAINTENANCE LETTER (OUTPATIENT)
Age: 27
End: 2022-01-22

## 2022-09-03 ENCOUNTER — HEALTH MAINTENANCE LETTER (OUTPATIENT)
Age: 27
End: 2022-09-03

## 2023-04-23 ENCOUNTER — HEALTH MAINTENANCE LETTER (OUTPATIENT)
Age: 28
End: 2023-04-23

## 2023-08-28 ENCOUNTER — OFFICE VISIT (OUTPATIENT)
Dept: OPHTHALMOLOGY | Facility: CLINIC | Age: 28
End: 2023-08-28
Attending: STUDENT IN AN ORGANIZED HEALTH CARE EDUCATION/TRAINING PROGRAM
Payer: COMMERCIAL

## 2023-08-28 DIAGNOSIS — H52.7 REFRACTIVE ERROR: ICD-10-CM

## 2023-08-28 DIAGNOSIS — H00.011 HORDEOLUM EXTERNUM OF RIGHT UPPER EYELID: Primary | ICD-10-CM

## 2023-08-28 PROCEDURE — 99213 OFFICE O/P EST LOW 20 MIN: CPT | Performed by: STUDENT IN AN ORGANIZED HEALTH CARE EDUCATION/TRAINING PROGRAM

## 2023-08-28 RX ORDER — NEOMYCIN SULFATE, POLYMYXIN B SULFATE, AND DEXAMETHASONE 3.5; 10000; 1 MG/G; [USP'U]/G; MG/G
0.5 OINTMENT OPHTHALMIC AT BEDTIME
Qty: 7 G | Refills: 1 | Status: SHIPPED | OUTPATIENT
Start: 2023-08-28 | End: 2023-09-27

## 2023-08-28 ASSESSMENT — CONF VISUAL FIELD
OD_NORMAL: 1
OD_INFERIOR_NASAL_RESTRICTION: 0
OS_NORMAL: 1
OS_SUPERIOR_NASAL_RESTRICTION: 0
OD_SUPERIOR_NASAL_RESTRICTION: 0
OD_SUPERIOR_TEMPORAL_RESTRICTION: 0
OS_INFERIOR_TEMPORAL_RESTRICTION: 0
OD_INFERIOR_TEMPORAL_RESTRICTION: 0
OS_INFERIOR_NASAL_RESTRICTION: 0
METHOD: COUNTING FINGERS
OS_SUPERIOR_TEMPORAL_RESTRICTION: 0

## 2023-08-28 ASSESSMENT — SLIT LAMP EXAM - LIDS: COMMENTS: MILD MGD

## 2023-08-28 ASSESSMENT — VISUAL ACUITY
METHOD: SNELLEN - LINEAR
OS_CC: 20/20
CORRECTION_TYPE: GLASSES
OD_CC: 20/20

## 2023-08-28 ASSESSMENT — TONOMETRY
IOP_METHOD: TONOPEN
OS_IOP_MMHG: 16
OD_IOP_MMHG: 17

## 2023-08-28 ASSESSMENT — EXTERNAL EXAM - RIGHT EYE: OD_EXAM: NORMAL

## 2023-08-28 ASSESSMENT — EXTERNAL EXAM - LEFT EYE: OS_EXAM: NORMAL

## 2023-08-28 NOTE — NURSING NOTE
Chief Complaints and History of Present Illnesses   Patient presents with    Stye / Hordeolum Evaluation     Chief Complaint(s) and History of Present Illness(es)       Stye / Hordeolum Evaluation              Laterality: right upper lid    Onset: weeks ago    Course: gradually worsening    Associated symptoms: lid swelling, discharge (clear in color) and eye pain (with blinking) (States va is the same)    Treatments tried: warm compresses, antibiotic drops and discontinued contact lens wear    Response to treatment: no improvement    Pain scale: 0/10              Comments    Feels that the swelling has increased and is looking for an oral vs the poly she is on  Started Poly last Sunday QID right eye   Warm compresses just yesterday   Екатерина Arnold COT 9:35 AM August 28, 2023

## 2023-08-28 NOTE — PROGRESS NOTES
HPI       Stye / Hordeolum Evaluation    In right upper lid.  This started weeks ago.  Since onset it is gradually worsening.  Associated symptoms include lid swelling, discharge (clear in color) and eye pain (with blinking) (States va is the same).  Treatments tried include warm compresses, antibiotic drops and discontinued contact lens wear.  Response to treatment was no improvement.  Pain was noted as 0/10.             Comments    Feels that the swelling has increased and is looking for an oral vs the poly she is on  Started Poly last Sunday QID right eye   Warm compresses just yesterday   Екатерина Arnold COT 9:35 AM August 28, 2023             Last edited by Екатерина Arnold on 8/28/2023  9:35 AM.          Review of systems for the eyes was negative other than the pertinent positives/negatives listed in the HPI.    Ocular Meds: polytrim QID OD    Ocular Hx: prior hordeolum; refractive error    FOHx: paternal grandmother - glaucoma    PMHx:   Past Medical History:   Diagnosis Date    Achilles tendonitis     Acne     Current moderate episode of major depressive disorder without prior episode (H) 10/28/2018    TJ (generalized anxiety disorder) 10/06/2017    Hearing loss in right ear     Hearing loss in right ear     Pneumonia of right upper lobe due to infectious organism 11/27/2017    Recurrent otitis media     as a child, still sometimes has ear porblems especially on places       Assessment & Plan      Farrah Bowen is a 27 year old female with the following diagnoses:    1. Hordeolum externum of right upper eyelid    2. Refractive error       External hordeolum of right upper eyelid  MGD OU  - no significant signs of inflammation to warrant PO antibiotics at this time, inflammation focal  - allergic to erythromycin  - will switch polytrim to maxitrol ophthalmic ointment at bedtime OD x 30 days  - advised warm compresses at least BID OU x 5-10 min each time  - return precautions reviewed, patient will be back  to home in [place] in a few weeks can follow up there if persists    Refractive error  Uses contact lenses  - hold on contact lens use until above resolves  - excellent BCVA  - observe    Counseled return/RD precautions    Patient disposition:   Return if symptoms worsen or fail to improve.    Attending Physician Attestation:  Complete documentation of historical and exam elements from today's encounter can be found in the full encounter summary report (not reduplicated in this progress note).  I personally obtained the chief complaint(s) and history of present illness.  I confirmed and edited as necessary the review of systems, past medical/surgical history, family history, social history, and examination findings as documented by others; and I examined the patient myself.  I personally reviewed the relevant tests, images, and reports as documented above.  I formulated and edited as necessary the assessment and plan and discussed the findings and management plan with the patient and family. . - Dorothy Pichardo MD

## 2024-09-14 ENCOUNTER — HEALTH MAINTENANCE LETTER (OUTPATIENT)
Age: 29
End: 2024-09-14